# Patient Record
Sex: FEMALE | Race: BLACK OR AFRICAN AMERICAN | NOT HISPANIC OR LATINO | Employment: UNEMPLOYED | URBAN - METROPOLITAN AREA
[De-identification: names, ages, dates, MRNs, and addresses within clinical notes are randomized per-mention and may not be internally consistent; named-entity substitution may affect disease eponyms.]

---

## 2017-04-06 ENCOUNTER — HISTORICAL (OUTPATIENT)
Dept: ADMINISTRATIVE | Facility: HOSPITAL | Age: 32
End: 2017-04-06

## 2017-04-06 LAB
ALBUMIN SERPL-MCNC: 4.3 GM/DL (ref 3.4–5)
ALBUMIN/GLOB SERPL: 1 RATIO (ref 1–2)
ALP SERPL-CCNC: 90 UNIT/L (ref 20–120)
ALT SERPL-CCNC: 20 UNIT/L
AST SERPL-CCNC: 19 UNIT/L
BILIRUB SERPL-MCNC: 0.4 MG/DL
BILIRUBIN DIRECT+TOT PNL SERPL-MCNC: <0.1 MG/DL
BILIRUBIN DIRECT+TOT PNL SERPL-MCNC: >0.3 MG/DL
BUN SERPL-MCNC: 15 MG/DL (ref 7–25)
CALCIUM SERPL-MCNC: 9.8 MG/DL (ref 8.4–10.3)
CHLORIDE SERPL-SCNC: 98 MMOL/L (ref 96–110)
CO2 SERPL-SCNC: 32 MMOL/L (ref 24–32)
CREAT SERPL-MCNC: 0.54 MG/DL (ref 0.7–1.1)
CREAT UR-MCNC: 139.7 MG/DL
GLOBULIN SER-MCNC: 3.7 GM/ML (ref 2.3–3.5)
GLUCOSE SERPL-MCNC: 153 MG/DL (ref 65–99)
MICROALBUMIN UR-MCNC: 74.9 MG/L (ref 0–19)
MICROALBUMIN/CREAT RATIO PNL UR: 53.6 MCG/MG CR (ref 0–29)
POTASSIUM SERPL-SCNC: 4 MMOL/L (ref 3.6–5.2)
PROT SERPL-MCNC: 8 GM/DL (ref 6–8)
SODIUM SERPL-SCNC: 138 MMOL/L (ref 135–146)

## 2017-08-18 ENCOUNTER — HISTORICAL (OUTPATIENT)
Dept: LAB | Facility: HOSPITAL | Age: 32
End: 2017-08-18

## 2017-08-18 LAB
ABS NEUT (OLG): 4.7 X10(3)/MCL (ref 1.5–6.9)
ALBUMIN SERPL-MCNC: 3.5 GM/DL (ref 3.4–5)
ALBUMIN/GLOB SERPL: 0.8 RATIO
ALP SERPL-CCNC: 124 UNIT/L (ref 30–113)
ALT SERPL-CCNC: 37 UNIT/L (ref 10–45)
AST SERPL-CCNC: 20 UNIT/L (ref 15–37)
BASOPHILS # BLD AUTO: 0 X10(3)/MCL (ref 0–0.1)
BASOPHILS NFR BLD AUTO: 0 % (ref 0–1)
BILIRUB SERPL-MCNC: 0.7 MG/DL (ref 0.1–0.9)
BILIRUBIN DIRECT+TOT PNL SERPL-MCNC: 0.2 MG/DL (ref 0–0.3)
BILIRUBIN DIRECT+TOT PNL SERPL-MCNC: 0.5 MG/DL
BUN SERPL-MCNC: 16 MG/DL (ref 10–20)
CALCIUM SERPL-MCNC: 9.3 MG/DL (ref 8–10.5)
CHLORIDE SERPL-SCNC: 100 MMOL/L (ref 100–108)
CHOLEST SERPL-MCNC: 202 MG/DL (ref 140–200)
CHOLEST/HDLC SERPL: <3 MG/DL (ref 35–59)
CO2 SERPL-SCNC: 29 MMOL/L (ref 21–35)
CREAT SERPL-MCNC: 0.88 MG/DL (ref 0.7–1.3)
CREAT UR-MCNC: 56.3 MG/DL
EOSINOPHIL # BLD AUTO: 0.1 X10(3)/MCL (ref 0–0.6)
EOSINOPHIL NFR BLD AUTO: 1 % (ref 0–5)
ERYTHROCYTE [DISTWIDTH] IN BLOOD BY AUTOMATED COUNT: 13.3 % (ref 11.5–17)
EST. AVERAGE GLUCOSE BLD GHB EST-MCNC: 255 MG/DL
GLOBULIN SER-MCNC: 4.5 GM/DL
GLUCOSE SERPL-MCNC: 404 MG/DL (ref 75–116)
HBA1C MFR BLD: 10.5 % (ref 4.8–6)
HCT VFR BLD AUTO: 40.3 % (ref 36–48)
HDLC SERPL-MCNC: 161 MG/DL (ref 35–59)
HGB BLD-MCNC: 13.4 GM/DL (ref 12–16)
LDLC SERPL CALC-MCNC: 36 MG/DL (ref 100–129)
LYMPHOCYTES # BLD AUTO: 2.6 X10(3)/MCL (ref 0.5–4.1)
LYMPHOCYTES NFR BLD AUTO: 32.5 % (ref 15–40)
MCH RBC QN AUTO: 28 PG (ref 27–34)
MCHC RBC AUTO-ENTMCNC: 33 GM/DL (ref 31–36)
MCV RBC AUTO: 83 FL (ref 80–99)
MICROALBUMIN UR-MCNC: 0.6 MG/DL (ref 0.1–2)
MICROALBUMIN/CREAT RATIO PNL UR: 10.7 MG/GM CR (ref 0–30)
MONOCYTES # BLD AUTO: 0.6 X10(3)/MCL (ref 0–1.1)
MONOCYTES NFR BLD AUTO: 7 % (ref 4–12)
NEUTROPHILS # BLD AUTO: 4.7 X10(3)/MCL (ref 1.5–6.9)
NEUTROPHILS NFR BLD AUTO: 58 % (ref 43–75)
PLATELET # BLD AUTO: 318 X10(3)/MCL (ref 140–400)
PMV BLD AUTO: 10.2 FL (ref 6.8–10)
POTASSIUM SERPL-SCNC: 4.7 MMOL/L (ref 3.6–5.2)
PROT SERPL-MCNC: 8 GM/DL (ref 6.4–8.2)
RBC # BLD AUTO: 4.83 X10(6)/MCL (ref 4.2–5.4)
SODIUM SERPL-SCNC: 138 MMOL/L (ref 135–145)
TRIGL SERPL-MCNC: 73 MG/DL (ref 35–150)
VIT B12 SERPL-MCNC: 711 PG/ML (ref 193–986)
VLDLC SERPL CALC-MCNC: 15 MG/DL
WBC # SPEC AUTO: 8.1 X10(3)/MCL (ref 4.5–11.5)

## 2017-08-23 ENCOUNTER — HISTORICAL (OUTPATIENT)
Dept: LAB | Facility: HOSPITAL | Age: 32
End: 2017-08-23

## 2017-08-23 LAB
ABS NEUT (OLG): 5.5 X10(3)/MCL (ref 1.5–6.9)
ALBUMIN SERPL-MCNC: 3.3 GM/DL (ref 3.4–5)
ALBUMIN/GLOB SERPL: 0.8 RATIO
ALP SERPL-CCNC: 105 UNIT/L (ref 30–113)
ALT SERPL-CCNC: 32 UNIT/L (ref 10–45)
AST SERPL-CCNC: 21 UNIT/L (ref 15–37)
BASOPHILS # BLD AUTO: 0 X10(3)/MCL (ref 0–0.1)
BASOPHILS NFR BLD AUTO: 0 % (ref 0–1)
BILIRUB SERPL-MCNC: 0.4 MG/DL (ref 0.1–0.9)
BILIRUBIN DIRECT+TOT PNL SERPL-MCNC: 0.1 MG/DL (ref 0–0.3)
BILIRUBIN DIRECT+TOT PNL SERPL-MCNC: 0.3 MG/DL
BUN SERPL-MCNC: 16 MG/DL (ref 10–20)
CALCIUM SERPL-MCNC: 9 MG/DL (ref 8–10.5)
CHLORIDE SERPL-SCNC: 98 MMOL/L (ref 100–108)
CO2 SERPL-SCNC: 29 MMOL/L (ref 21–35)
CREAT SERPL-MCNC: 0.96 MG/DL (ref 0.7–1.3)
EOSINOPHIL # BLD AUTO: 0.1 X10(3)/MCL (ref 0–0.6)
EOSINOPHIL NFR BLD AUTO: 2 % (ref 0–5)
ERYTHROCYTE [DISTWIDTH] IN BLOOD BY AUTOMATED COUNT: 13.1 % (ref 11.5–17)
ERYTHROCYTE [SEDIMENTATION RATE] IN BLOOD: 11 MM/HR (ref 0–20)
GLOBULIN SER-MCNC: 4 GM/DL
GLUCOSE SERPL-MCNC: 495 MG/DL (ref 75–116)
HCT VFR BLD AUTO: 38.8 % (ref 36–48)
HGB BLD-MCNC: 12.6 GM/DL (ref 12–16)
LYMPHOCYTES # BLD AUTO: 1.9 X10(3)/MCL (ref 0.5–4.1)
LYMPHOCYTES NFR BLD AUTO: 24.3 % (ref 15–40)
MAGNESIUM SERPL-MCNC: 1.7 MG/DL (ref 1.8–2.4)
MCH RBC QN AUTO: 28 PG (ref 27–34)
MCHC RBC AUTO-ENTMCNC: 32 GM/DL (ref 31–36)
MCV RBC AUTO: 85 FL (ref 80–99)
MONOCYTES # BLD AUTO: 0.4 X10(3)/MCL (ref 0–1.1)
MONOCYTES NFR BLD AUTO: 5 % (ref 4–12)
NEUTROPHILS # BLD AUTO: 5.5 X10(3)/MCL (ref 1.5–6.9)
NEUTROPHILS NFR BLD AUTO: 69 % (ref 43–75)
PLATELET # BLD AUTO: 277 X10(3)/MCL (ref 140–400)
PMV BLD AUTO: 10.3 FL (ref 6.8–10)
POTASSIUM SERPL-SCNC: 4.1 MMOL/L (ref 3.6–5.2)
PROT SERPL-MCNC: 7.3 GM/DL (ref 6.4–8.2)
RBC # BLD AUTO: 4.58 X10(6)/MCL (ref 4.2–5.4)
SODIUM SERPL-SCNC: 134 MMOL/L (ref 135–145)
WBC # SPEC AUTO: 8 X10(3)/MCL (ref 4.5–11.5)

## 2017-10-13 ENCOUNTER — HOSPITAL ENCOUNTER (OUTPATIENT)
Dept: ONCOLOGY | Facility: HOSPITAL | Age: 32
End: 2017-10-16
Attending: INTERNAL MEDICINE | Admitting: INTERNAL MEDICINE

## 2017-10-13 LAB
ABS NEUT (OLG): 5.57 X10(3)/MCL (ref 2.1–9.2)
ALBUMIN SERPL-MCNC: 3.2 GM/DL (ref 3.4–5)
ALBUMIN/GLOB SERPL: 0.9 {RATIO}
ALP SERPL-CCNC: 125 UNIT/L (ref 38–126)
ALT SERPL-CCNC: 33 UNIT/L (ref 12–78)
AST SERPL-CCNC: 35 UNIT/L (ref 15–37)
BASOPHILS # BLD AUTO: 0 X10(3)/MCL (ref 0–0.2)
BASOPHILS NFR BLD AUTO: 0 %
BILIRUB SERPL-MCNC: 0.2 MG/DL (ref 0.2–1)
BILIRUBIN DIRECT+TOT PNL SERPL-MCNC: 0.1 MG/DL (ref 0–0.2)
BILIRUBIN DIRECT+TOT PNL SERPL-MCNC: 0.1 MG/DL (ref 0–0.8)
BUN SERPL-MCNC: 11 MG/DL (ref 7–18)
CALCIUM SERPL-MCNC: 9.3 MG/DL (ref 8.5–10.1)
CHLORIDE SERPL-SCNC: 99 MMOL/L (ref 98–107)
CO2 SERPL-SCNC: 28 MMOL/L (ref 21–32)
CREAT SERPL-MCNC: 0.83 MG/DL (ref 0.55–1.02)
EOSINOPHIL # BLD AUTO: 0.1 X10(3)/MCL (ref 0–0.9)
EOSINOPHIL NFR BLD AUTO: 2 %
ERYTHROCYTE [DISTWIDTH] IN BLOOD BY AUTOMATED COUNT: 13.4 % (ref 11.5–17)
GLOBULIN SER-MCNC: 3.7 GM/DL (ref 2.4–3.5)
GLUCOSE SERPL-MCNC: 362 MG/DL (ref 74–106)
HCT VFR BLD AUTO: 40.2 % (ref 37–47)
HGB BLD-MCNC: 13 GM/DL (ref 12–16)
LIPASE SERPL-CCNC: 208 UNIT/L (ref 73–393)
LYMPHOCYTES # BLD AUTO: 2.2 X10(3)/MCL (ref 0.6–4.6)
LYMPHOCYTES NFR BLD AUTO: 26 %
MCH RBC QN AUTO: 28.6 PG (ref 27–31)
MCHC RBC AUTO-ENTMCNC: 32.3 GM/DL (ref 33–36)
MCV RBC AUTO: 88.4 FL (ref 80–94)
MONOCYTES # BLD AUTO: 0.5 X10(3)/MCL (ref 0.1–1.3)
MONOCYTES NFR BLD AUTO: 6 %
NEUTROPHILS # BLD AUTO: 5.57 X10(3)/MCL (ref 1.4–7.9)
NEUTROPHILS NFR BLD AUTO: 66 %
PLATELET # BLD AUTO: 252 X10(3)/MCL (ref 130–400)
PMV BLD AUTO: 10.1 FL (ref 9.4–12.4)
POTASSIUM SERPL-SCNC: 4.7 MMOL/L (ref 3.5–5.1)
PROT SERPL-MCNC: 6.9 GM/DL (ref 6.4–8.2)
RBC # BLD AUTO: 4.55 X10(6)/MCL (ref 4.2–5.4)
SODIUM SERPL-SCNC: 136 MMOL/L (ref 136–145)
WBC # SPEC AUTO: 8.4 X10(3)/MCL (ref 4.5–11.5)

## 2017-10-14 LAB
APPEARANCE, UA: CLEAR
BACTERIA SPEC CULT: ABNORMAL /HPF
BILIRUB UR QL STRIP: NEGATIVE
COLOR UR: YELLOW
GLUCOSE (UA): ABNORMAL
HGB UR QL STRIP: NEGATIVE
KETONES UR QL STRIP: NEGATIVE
LEUKOCYTE ESTERASE UR QL STRIP: ABNORMAL
NITRITE UR QL STRIP: NEGATIVE
PH UR STRIP: 7.5 [PH] (ref 5–9)
PROT UR QL STRIP: NEGATIVE
RBC #/AREA URNS HPF: ABNORMAL /[HPF]
SP GR UR STRIP: 1.03 (ref 1–1.03)
SQUAMOUS EPITHELIAL, UA: ABNORMAL
UROBILINOGEN UR STRIP-ACNC: 0.2
WBC #/AREA URNS HPF: 11 /HPF (ref 0–3)

## 2017-10-15 LAB
ABS NEUT (OLG): 3.84 X10(3)/MCL (ref 2.1–9.2)
BASOPHILS # BLD AUTO: 0 X10(3)/MCL (ref 0–0.2)
BASOPHILS NFR BLD AUTO: 0 %
BUN SERPL-MCNC: 13 MG/DL (ref 7–18)
CALCIUM SERPL-MCNC: 8.3 MG/DL (ref 8.5–10.1)
CHLORIDE SERPL-SCNC: 104 MMOL/L (ref 98–107)
CO2 SERPL-SCNC: 28 MMOL/L (ref 21–32)
CREAT SERPL-MCNC: 0.64 MG/DL (ref 0.55–1.02)
EOSINOPHIL # BLD AUTO: 0.2 X10(3)/MCL (ref 0–0.9)
EOSINOPHIL NFR BLD AUTO: 3 %
ERYTHROCYTE [DISTWIDTH] IN BLOOD BY AUTOMATED COUNT: 13.7 % (ref 11.5–17)
GLUCOSE SERPL-MCNC: 249 MG/DL (ref 74–106)
HCT VFR BLD AUTO: 36.9 % (ref 37–47)
HGB BLD-MCNC: 11.6 GM/DL (ref 12–16)
LYMPHOCYTES # BLD AUTO: 2.6 X10(3)/MCL (ref 0.6–4.6)
LYMPHOCYTES NFR BLD AUTO: 36 %
MCH RBC QN AUTO: 27.8 PG (ref 27–31)
MCHC RBC AUTO-ENTMCNC: 31.4 GM/DL (ref 33–36)
MCV RBC AUTO: 88.5 FL (ref 80–94)
MONOCYTES # BLD AUTO: 0.6 X10(3)/MCL (ref 0.1–1.3)
MONOCYTES NFR BLD AUTO: 8 %
NEUTROPHILS # BLD AUTO: 3.84 X10(3)/MCL (ref 2.1–9.2)
NEUTROPHILS NFR BLD AUTO: 52 %
PLATELET # BLD AUTO: 208 X10(3)/MCL (ref 130–400)
PMV BLD AUTO: 9.9 FL (ref 9.4–12.4)
POTASSIUM SERPL-SCNC: 4.3 MMOL/L (ref 3.5–5.1)
RBC # BLD AUTO: 4.17 X10(6)/MCL (ref 4.2–5.4)
SODIUM SERPL-SCNC: 138 MMOL/L (ref 136–145)
WBC # SPEC AUTO: 7.4 X10(3)/MCL (ref 4.5–11.5)

## 2018-02-01 ENCOUNTER — HISTORICAL (OUTPATIENT)
Dept: LAB | Facility: HOSPITAL | Age: 33
End: 2018-02-01

## 2018-02-01 LAB
ABS NEUT (OLG): 6.2 X10(3)/MCL (ref 1.5–6.9)
ALBUMIN SERPL-MCNC: 3.3 GM/DL (ref 3.4–5)
ALBUMIN/GLOB SERPL: 0.7 RATIO
ALP SERPL-CCNC: 109 UNIT/L (ref 30–113)
ALT SERPL-CCNC: 27 UNIT/L (ref 10–45)
AST SERPL-CCNC: 19 UNIT/L (ref 15–37)
BASOPHILS # BLD AUTO: 0 X10(3)/MCL (ref 0–0.1)
BASOPHILS NFR BLD AUTO: 0 % (ref 0–1)
BILIRUB SERPL-MCNC: 0.4 MG/DL (ref 0.1–0.9)
BILIRUBIN DIRECT+TOT PNL SERPL-MCNC: 0.1 MG/DL (ref 0–0.3)
BILIRUBIN DIRECT+TOT PNL SERPL-MCNC: 0.3 MG/DL
BUN SERPL-MCNC: 18 MG/DL (ref 10–20)
CALCIUM SERPL-MCNC: 10.2 MG/DL (ref 8–10.5)
CHLORIDE SERPL-SCNC: 102 MMOL/L (ref 100–108)
CO2 SERPL-SCNC: 29 MMOL/L (ref 21–35)
CREAT SERPL-MCNC: 0.92 MG/DL (ref 0.7–1.3)
EOSINOPHIL # BLD AUTO: 0.2 X10(3)/MCL (ref 0–0.6)
EOSINOPHIL NFR BLD AUTO: 2 % (ref 0–5)
ERYTHROCYTE [DISTWIDTH] IN BLOOD BY AUTOMATED COUNT: 14.2 % (ref 11.5–17)
ERYTHROCYTE [SEDIMENTATION RATE] IN BLOOD: 26 MM/HR (ref 0–20)
GLOBULIN SER-MCNC: 4.8 GM/DL
GLUCOSE SERPL-MCNC: 187 MG/DL (ref 75–116)
HCT VFR BLD AUTO: 37.1 % (ref 36–48)
HGB BLD-MCNC: 12 GM/DL (ref 12–16)
LYMPHOCYTES # BLD AUTO: 2.6 X10(3)/MCL (ref 0.5–4.1)
LYMPHOCYTES NFR BLD AUTO: 26.6 % (ref 15–40)
MAGNESIUM SERPL-MCNC: 2.2 MG/DL (ref 1.8–2.4)
MCH RBC QN AUTO: 27 PG (ref 27–34)
MCHC RBC AUTO-ENTMCNC: 32 GM/DL (ref 31–36)
MCV RBC AUTO: 83 FL (ref 80–99)
MONOCYTES # BLD AUTO: 0.6 X10(3)/MCL (ref 0–1.1)
MONOCYTES NFR BLD AUTO: 6 % (ref 4–12)
NEUTROPHILS # BLD AUTO: 6.2 X10(3)/MCL (ref 1.5–6.9)
NEUTROPHILS NFR BLD AUTO: 64 % (ref 43–75)
PLATELET # BLD AUTO: 387 X10(3)/MCL (ref 140–400)
PMV BLD AUTO: 9.9 FL (ref 6.8–10)
POTASSIUM SERPL-SCNC: 4.4 MMOL/L (ref 3.6–5.2)
PROT SERPL-MCNC: 8.1 GM/DL (ref 6.4–8.2)
RBC # BLD AUTO: 4.48 X10(6)/MCL (ref 4.2–5.4)
SODIUM SERPL-SCNC: 138 MMOL/L (ref 135–145)
TSH SERPL-ACNC: 2.14 MIU/ML (ref 0.36–3.74)
WBC # SPEC AUTO: 9.6 X10(3)/MCL (ref 4.5–11.5)

## 2018-05-06 ENCOUNTER — HOSPITAL ENCOUNTER (OUTPATIENT)
Dept: OCCUPATIONAL THERAPY | Facility: HOSPITAL | Age: 33
End: 2018-05-09
Attending: INTERNAL MEDICINE | Admitting: INTERNAL MEDICINE

## 2018-05-06 LAB
ABS NEUT (OLG): 5.99 X10(3)/MCL (ref 2.1–9.2)
ALBUMIN SERPL-MCNC: 3.3 GM/DL (ref 3.4–5)
ALBUMIN/GLOB SERPL: 0.9 RATIO (ref 1.1–2)
ALP SERPL-CCNC: 120 UNIT/L (ref 38–126)
ALT SERPL-CCNC: 33 UNIT/L (ref 12–78)
APPEARANCE, UA: CLEAR
AST SERPL-CCNC: 31 UNIT/L (ref 15–37)
B-OH-BUTYR SERPL-MCNC: 10 MG/DL (ref 0–2.78)
BACTERIA SPEC CULT: ABNORMAL /HPF
BASOPHILS # BLD AUTO: 0 X10(3)/MCL (ref 0–0.2)
BASOPHILS NFR BLD AUTO: 0 %
BILIRUB SERPL-MCNC: 0.7 MG/DL (ref 0.2–1)
BILIRUB UR QL STRIP: NEGATIVE
BILIRUBIN DIRECT+TOT PNL SERPL-MCNC: 0.2 MG/DL (ref 0–0.5)
BILIRUBIN DIRECT+TOT PNL SERPL-MCNC: 0.5 MG/DL (ref 0–0.8)
BUN SERPL-MCNC: 16 MG/DL (ref 7–18)
CALCIUM SERPL-MCNC: 9.4 MG/DL (ref 8.5–10.1)
CHLORIDE SERPL-SCNC: 96 MMOL/L (ref 98–107)
CO2 SERPL-SCNC: 27 MMOL/L (ref 21–32)
COLOR UR: YELLOW
CREAT SERPL-MCNC: 1.07 MG/DL (ref 0.55–1.02)
EOSINOPHIL # BLD AUTO: 0.1 X10(3)/MCL (ref 0–0.9)
EOSINOPHIL NFR BLD AUTO: 1 %
ERYTHROCYTE [DISTWIDTH] IN BLOOD BY AUTOMATED COUNT: 13.7 % (ref 11.5–17)
EST. AVERAGE GLUCOSE BLD GHB EST-MCNC: 255 MG/DL
GLOBULIN SER-MCNC: 3.8 GM/DL (ref 2.4–3.5)
GLUCOSE (UA): ABNORMAL
GLUCOSE SERPL-MCNC: 610 MG/DL (ref 74–106)
HBA1C MFR BLD: 10.5 % (ref 4.2–6.3)
HCO3 UR-SCNC: 26 MMOL/L (ref 22–26)
HCT VFR BLD AUTO: 41.7 % (ref 37–47)
HGB BLD-MCNC: 12.8 GM/DL (ref 12–16)
HGB UR QL STRIP: NEGATIVE
KETONES UR QL STRIP: ABNORMAL
LEUKOCYTE ESTERASE UR QL STRIP: NEGATIVE
LIPASE SERPL-CCNC: 233 UNIT/L (ref 73–393)
LYMPHOCYTES # BLD AUTO: 1.8 X10(3)/MCL (ref 0.6–4.6)
LYMPHOCYTES NFR BLD AUTO: 21 %
MCH RBC QN AUTO: 27.1 PG (ref 27–31)
MCHC RBC AUTO-ENTMCNC: 30.7 GM/DL (ref 33–36)
MCV RBC AUTO: 88.2 FL (ref 80–94)
MONOCYTES # BLD AUTO: 0.5 X10(3)/MCL (ref 0.1–1.3)
MONOCYTES NFR BLD AUTO: 6 %
NEUTROPHILS # BLD AUTO: 5.99 X10(3)/MCL (ref 2.1–9.2)
NEUTROPHILS NFR BLD AUTO: 71 %
NITRITE UR QL STRIP: NEGATIVE
O2 HGB ARTERIAL: 96.7 % (ref 94–97)
PCO2 BLDA: 42 MMHG (ref 35–45)
PH SMN: 7.4 [PH] (ref 7.35–7.45)
PH UR STRIP: 7 [PH] (ref 5–9)
PLATELET # BLD AUTO: 265 X10(3)/MCL (ref 130–400)
PMV BLD AUTO: 10.9 FL (ref 9.4–12.4)
PO2 BLDA: 106 MMHG (ref 80–100)
POC ALLENS TEST: ABNORMAL
POC BE: 1 (ref -2–2)
POC CAO2: 17 ML/DL (ref 15.7–21.6)
POC CO HGB: 1 %
POC CO2: 27.3 MMOL/L (ref 22–27)
POC IONIZED CALCIUM: 1.21 MMOL/L (ref 1.12–1.23)
POC MET HGB: 0.2 % (ref 0.4–1.5)
POC SAMPLESOURCE: ABNORMAL
POC SATURATED O2: 98.1 % (ref 95–98)
POC SITE: ABNORMAL
POC THB: 12.4 GM/DL (ref 12–16)
POC TREATMENT: ABNORMAL
POTASSIUM BLD-SCNC: 4.8 MMOL/L (ref 3.6–5)
POTASSIUM SERPL-SCNC: 5 MMOL/L (ref 3.5–5.1)
PROT SERPL-MCNC: 7.1 GM/DL (ref 6.4–8.2)
PROT UR QL STRIP: NEGATIVE
RBC # BLD AUTO: 4.73 X10(6)/MCL (ref 4.2–5.4)
RBC #/AREA URNS HPF: ABNORMAL /[HPF]
SETTING 1 ABG: ABNORMAL
SETTING 2 ABG: ABNORMAL
SETTING 3 ABG: ABNORMAL
SETTING 4 ABG: ABNORMAL
SODIUM BLD-SCNC: 128 MMOL/L (ref 137–145)
SODIUM SERPL-SCNC: 131 MMOL/L (ref 136–145)
SP GR UR STRIP: 1.03 (ref 1–1.03)
SQUAMOUS EPITHELIAL, UA: ABNORMAL
UROBILINOGEN UR STRIP-ACNC: 0.2
WBC # SPEC AUTO: 8.4 X10(3)/MCL (ref 4.5–11.5)
WBC #/AREA URNS HPF: ABNORMAL /[HPF]

## 2018-05-07 LAB
ABS NEUT (OLG): 3.71 X10(3)/MCL (ref 2.1–9.2)
ALBUMIN SERPL-MCNC: 2.7 GM/DL (ref 3.4–5)
ALBUMIN/GLOB SERPL: 0.8 {RATIO}
ALP SERPL-CCNC: 98 UNIT/L (ref 38–126)
ALT SERPL-CCNC: 25 UNIT/L (ref 12–78)
AST SERPL-CCNC: 18 UNIT/L (ref 15–37)
BASOPHILS # BLD AUTO: 0 X10(3)/MCL (ref 0–0.2)
BASOPHILS NFR BLD AUTO: 0 %
BILIRUB SERPL-MCNC: 0.2 MG/DL (ref 0.2–1)
BILIRUBIN DIRECT+TOT PNL SERPL-MCNC: 0.1 MG/DL (ref 0–0.2)
BILIRUBIN DIRECT+TOT PNL SERPL-MCNC: 0.1 MG/DL (ref 0–0.8)
BUN SERPL-MCNC: 8 MG/DL (ref 7–18)
CALCIUM SERPL-MCNC: 8.3 MG/DL (ref 8.5–10.1)
CHLORIDE SERPL-SCNC: 108 MMOL/L (ref 98–107)
CO2 SERPL-SCNC: 26 MMOL/L (ref 21–32)
CREAT SERPL-MCNC: 0.84 MG/DL (ref 0.55–1.02)
EOSINOPHIL # BLD AUTO: 0.2 X10(3)/MCL (ref 0–0.9)
EOSINOPHIL NFR BLD AUTO: 3 %
ERYTHROCYTE [DISTWIDTH] IN BLOOD BY AUTOMATED COUNT: 13.8 % (ref 11.5–17)
GLOBULIN SER-MCNC: 3.2 GM/DL (ref 2.4–3.5)
GLUCOSE SERPL-MCNC: 238 MG/DL (ref 74–106)
HCT VFR BLD AUTO: 37.6 % (ref 37–47)
HGB BLD-MCNC: 11.5 GM/DL (ref 12–16)
LYMPHOCYTES # BLD AUTO: 2.3 X10(3)/MCL (ref 0.6–4.6)
LYMPHOCYTES NFR BLD AUTO: 34 %
MCH RBC QN AUTO: 27.6 PG (ref 27–31)
MCHC RBC AUTO-ENTMCNC: 30.6 GM/DL (ref 33–36)
MCV RBC AUTO: 90.4 FL (ref 80–94)
MONOCYTES # BLD AUTO: 0.5 X10(3)/MCL (ref 0.1–1.3)
MONOCYTES NFR BLD AUTO: 8 %
NEUTROPHILS # BLD AUTO: 3.71 X10(3)/MCL (ref 2.1–9.2)
NEUTROPHILS NFR BLD AUTO: 55 %
PLATELET # BLD AUTO: 252 X10(3)/MCL (ref 130–400)
PMV BLD AUTO: 9.9 FL (ref 9.4–12.4)
POTASSIUM SERPL-SCNC: 4.3 MMOL/L (ref 3.5–5.1)
PROT SERPL-MCNC: 5.9 GM/DL (ref 6.4–8.2)
RBC # BLD AUTO: 4.16 X10(6)/MCL (ref 4.2–5.4)
SODIUM SERPL-SCNC: 143 MMOL/L (ref 136–145)
WBC # SPEC AUTO: 6.8 X10(3)/MCL (ref 4.5–11.5)

## 2018-06-14 ENCOUNTER — TELEPHONE (OUTPATIENT)
Dept: GASTROENTEROLOGY | Facility: CLINIC | Age: 33
End: 2018-06-14

## 2018-09-11 ENCOUNTER — TELEPHONE (OUTPATIENT)
Dept: GASTROENTEROLOGY | Facility: CLINIC | Age: 33
End: 2018-09-11

## 2019-01-28 ENCOUNTER — HOSPITAL ENCOUNTER (OUTPATIENT)
Dept: OCCUPATIONAL THERAPY | Facility: HOSPITAL | Age: 34
End: 2019-01-30
Attending: INTERNAL MEDICINE | Admitting: INTERNAL MEDICINE

## 2019-01-28 LAB
ABS NEUT (OLG): 5.55 X10(3)/MCL (ref 2.1–9.2)
ALBUMIN SERPL-MCNC: 3.5 GM/DL (ref 3.4–5)
ALBUMIN/GLOB SERPL: 1 RATIO (ref 1.1–2)
ALP SERPL-CCNC: 95 UNIT/L (ref 38–126)
ALT SERPL-CCNC: 18 UNIT/L (ref 12–78)
APPEARANCE, UA: CLEAR
AST SERPL-CCNC: 39 UNIT/L (ref 15–37)
BACTERIA SPEC CULT: ABNORMAL /HPF
BASOPHILS # BLD AUTO: 0 X10(3)/MCL (ref 0–0.2)
BASOPHILS NFR BLD AUTO: 0 %
BILIRUB SERPL-MCNC: 0.5 MG/DL (ref 0.2–1)
BILIRUB UR QL STRIP: NEGATIVE
BILIRUBIN DIRECT+TOT PNL SERPL-MCNC: 0.1 MG/DL (ref 0–0.5)
BILIRUBIN DIRECT+TOT PNL SERPL-MCNC: 0.4 MG/DL (ref 0–0.8)
BUN SERPL-MCNC: 13 MG/DL (ref 7–18)
CALCIUM SERPL-MCNC: 8.7 MG/DL (ref 8.5–10.1)
CHLORIDE SERPL-SCNC: 99 MMOL/L (ref 98–107)
CO2 SERPL-SCNC: 30 MMOL/L (ref 21–32)
COLOR UR: YELLOW
CREAT SERPL-MCNC: 0.93 MG/DL (ref 0.55–1.02)
EOSINOPHIL # BLD AUTO: 0.2 X10(3)/MCL (ref 0–0.9)
EOSINOPHIL NFR BLD AUTO: 2 %
ERYTHROCYTE [DISTWIDTH] IN BLOOD BY AUTOMATED COUNT: 12.5 % (ref 11.5–17)
GLOBULIN SER-MCNC: 3.5 GM/DL (ref 2.4–3.5)
GLUCOSE (UA): ABNORMAL
GLUCOSE SERPL-MCNC: 552 MG/DL (ref 74–106)
HCT VFR BLD AUTO: 42.4 % (ref 37–47)
HGB BLD-MCNC: 13.6 GM/DL (ref 12–16)
HGB UR QL STRIP: ABNORMAL
KETONES UR QL STRIP: ABNORMAL
LEUKOCYTE ESTERASE UR QL STRIP: NEGATIVE
LIPASE SERPL-CCNC: 106 UNIT/L (ref 73–393)
LYMPHOCYTES # BLD AUTO: 1.8 X10(3)/MCL (ref 0.6–4.6)
LYMPHOCYTES NFR BLD AUTO: 22 %
MCH RBC QN AUTO: 28.1 PG (ref 27–31)
MCHC RBC AUTO-ENTMCNC: 32.1 GM/DL (ref 33–36)
MCV RBC AUTO: 87.6 FL (ref 80–94)
MONOCYTES # BLD AUTO: 0.6 X10(3)/MCL (ref 0.1–1.3)
MONOCYTES NFR BLD AUTO: 7 %
NEUTROPHILS # BLD AUTO: 5.55 X10(3)/MCL (ref 2.1–9.2)
NEUTROPHILS NFR BLD AUTO: 68 %
NITRITE UR QL STRIP: NEGATIVE
PH UR STRIP: 8 [PH] (ref 5–9)
PLATELET # BLD AUTO: 256 X10(3)/MCL (ref 130–400)
PMV BLD AUTO: 11.2 FL (ref 9.4–12.4)
POTASSIUM SERPL-SCNC: 4.6 MMOL/L (ref 3.5–5.1)
PROT SERPL-MCNC: 7 GM/DL (ref 6.4–8.2)
PROT UR QL STRIP: NEGATIVE
RBC # BLD AUTO: 4.84 X10(6)/MCL (ref 4.2–5.4)
RBC #/AREA URNS HPF: 116 /HPF (ref 0–2)
RESTICK: NORMAL
SODIUM SERPL-SCNC: 134 MMOL/L (ref 136–145)
SP GR UR STRIP: 1.03 (ref 1–1.03)
SQUAMOUS EPITHELIAL, UA: ABNORMAL
UROBILINOGEN UR STRIP-ACNC: 0.2
WBC # SPEC AUTO: 8.2 X10(3)/MCL (ref 4.5–11.5)
WBC #/AREA URNS HPF: ABNORMAL /[HPF]

## 2019-01-29 LAB
BUN SERPL-MCNC: 12 MG/DL (ref 7–18)
CALCIUM SERPL-MCNC: 7.6 MG/DL (ref 8.5–10.1)
CHLORIDE SERPL-SCNC: 109 MMOL/L (ref 98–107)
CO2 SERPL-SCNC: 27 MMOL/L (ref 21–32)
CREAT SERPL-MCNC: 0.83 MG/DL (ref 0.55–1.02)
CREAT/UREA NIT SERPL: 14.5
GLUCOSE SERPL-MCNC: 298 MG/DL (ref 74–106)
POTASSIUM SERPL-SCNC: 4.2 MMOL/L (ref 3.5–5.1)
SODIUM SERPL-SCNC: 143 MMOL/L (ref 136–145)

## 2019-08-25 ENCOUNTER — HOSPITAL ENCOUNTER (OUTPATIENT)
Dept: MEDSURG UNIT | Facility: HOSPITAL | Age: 34
End: 2019-08-28
Attending: INTERNAL MEDICINE | Admitting: INTERNAL MEDICINE

## 2019-08-25 LAB
ABS NEUT (OLG): 4.3 X10(3)/MCL (ref 1.5–6.9)
ALBUMIN SERPL-MCNC: 3.5 GM/DL (ref 3.4–5)
ALBUMIN/GLOB SERPL: 0.7 RATIO
ALP SERPL-CCNC: 113 UNIT/L (ref 30–113)
AMYLASE SERPL-CCNC: 71 UNIT/L (ref 25–115)
APPEARANCE, UA: ABNORMAL
AST SERPL-CCNC: 25 UNIT/L (ref 15–37)
B-HCG FREE SERPL-ACNC: 1 MIU/ML
BASOPHILS # BLD AUTO: 0 X10(3)/MCL (ref 0–0.1)
BASOPHILS NFR BLD AUTO: 0 % (ref 0–1)
BILIRUB SERPL-MCNC: 0.2 MG/DL (ref 0.1–0.9)
BILIRUB UR QL STRIP: NEGATIVE
BILIRUBIN DIRECT+TOT PNL SERPL-MCNC: 0.1 MG/DL
BILIRUBIN DIRECT+TOT PNL SERPL-MCNC: 0.1 MG/DL (ref 0–0.3)
BUN SERPL-MCNC: 10 MG/DL (ref 10–20)
BUN SERPL-MCNC: 12 MG/DL (ref 10–20)
CALCIUM SERPL-MCNC: 7.7 MG/DL (ref 8–10.5)
CALCIUM SERPL-MCNC: 9 MG/DL (ref 8–10.5)
CHLORIDE SERPL-SCNC: 105 MMOL/L (ref 100–108)
CHLORIDE SERPL-SCNC: 96 MMOL/L (ref 100–108)
CO2 SERPL-SCNC: 26 MMOL/L (ref 21–35)
CO2 SERPL-SCNC: 27 MMOL/L (ref 21–35)
COLOR UR: ABNORMAL
CREAT SERPL-MCNC: 0.87 MG/DL (ref 0.7–1.3)
CREAT SERPL-MCNC: 1.12 MG/DL (ref 0.7–1.3)
CREAT/UREA NIT SERPL: 11
EOSINOPHIL # BLD AUTO: 0.1 X10(3)/MCL (ref 0–0.6)
EOSINOPHIL NFR BLD AUTO: 2 % (ref 0–5)
ERYTHROCYTE [DISTWIDTH] IN BLOOD BY AUTOMATED COUNT: 12.7 % (ref 11.5–17)
GLOBULIN SER-MCNC: 4.7 GM/DL
GLUCOSE (UA): >=1000 MG/DL
GLUCOSE SERPL-MCNC: 363 MG/DL (ref 75–116)
GLUCOSE SERPL-MCNC: 616 MG/DL (ref 75–116)
HCO3 UR-SCNC: 23.7 MMOL/L (ref 22–26)
HCT VFR BLD AUTO: 44.7 % (ref 36–48)
HGB BLD-MCNC: 13.9 GM/DL (ref 12–16)
HGB UR QL STRIP: NEGATIVE
IMM GRANULOCYTES # BLD AUTO: 0.01 10*3/UL (ref 0–0.02)
IMM GRANULOCYTES NFR BLD AUTO: 0.2 % (ref 0–0.43)
KETONES UR QL STRIP: NEGATIVE
LEUKOCYTE ESTERASE UR QL STRIP: NEGATIVE
LIPASE SERPL-CCNC: 182 UNIT/L (ref 21–261)
LYMPHOCYTES # BLD AUTO: 1.7 X10(3)/MCL (ref 0.5–4.1)
LYMPHOCYTES NFR BLD AUTO: 26 % (ref 15–40)
MCH RBC QN AUTO: 28 PG (ref 27–34)
MCHC RBC AUTO-ENTMCNC: 31 GM/DL (ref 31–36)
MCV RBC AUTO: 91 FL (ref 80–99)
MONOCYTES # BLD AUTO: 0.4 X10(3)/MCL (ref 0–1.1)
MONOCYTES NFR BLD AUTO: 6 % (ref 4–12)
NEUTROPHILS # BLD AUTO: 4.3 X10(3)/MCL (ref 1.5–6.9)
NEUTROPHILS NFR BLD AUTO: 66 % (ref 43–75)
NITRITE UR QL STRIP: NEGATIVE
PCO2 BLDA: 44.3 MMHG (ref 35–45)
PH SMN: 7.34 [PH] (ref 7.35–7.45)
PH UR STRIP: 7 [PH]
PLATELET # BLD AUTO: 220 X10(3)/MCL (ref 140–400)
PMV BLD AUTO: 10.7 FL (ref 6.8–10)
PO2 BLDA: 108 MMHG (ref 80–100)
POC ALLENS TEST: NORMAL
POC BE: -2 MMOL/L (ref -2–3)
POC SAMPLESOURCE: NORMAL
POC SATURATED O2: 98 % (ref 96–97)
POC SITE: NORMAL
POC TCO2: 25 MMOL/L (ref 24–29)
POC TREATMENT: NORMAL
POTASSIUM SERPL-SCNC: 4.1 MMOL/L (ref 3.6–5.2)
POTASSIUM SERPL-SCNC: 5.1 MMOL/L (ref 3.6–5.2)
PROT SERPL-MCNC: 8.2 GM/DL (ref 6.4–8.2)
PROT UR QL STRIP: NEGATIVE
RBC # BLD AUTO: 4.92 X10(6)/MCL (ref 4.2–5.4)
SODIUM SERPL-SCNC: 131 MMOL/L (ref 135–145)
SODIUM SERPL-SCNC: 139 MMOL/L (ref 135–145)
SP GR UR STRIP: <=1.005
TSH SERPL-ACNC: 1.58 MIU/ML (ref 0.36–3.74)
UROBILINOGEN UR STRIP-ACNC: 0.2 EU/DL
WBC # SPEC AUTO: 6.5 X10(3)/MCL (ref 4.5–11.5)

## 2019-08-26 LAB
ABS NEUT (OLG): 2.6 X10(3)/MCL (ref 1.5–6.9)
ALBUMIN SERPL-MCNC: 2.6 GM/DL (ref 3.4–5)
ALBUMIN/GLOB SERPL: 0.8 RATIO
ALP SERPL-CCNC: 81 UNIT/L (ref 30–113)
ALT SERPL-CCNC: 22 UNIT/L (ref 10–45)
AST SERPL-CCNC: 21 UNIT/L (ref 15–37)
BASOPHILS # BLD AUTO: 0 X10(3)/MCL (ref 0–0.1)
BASOPHILS NFR BLD AUTO: 0 % (ref 0–1)
BILIRUB SERPL-MCNC: 0.1 MG/DL (ref 0.1–0.9)
BILIRUBIN DIRECT+TOT PNL SERPL-MCNC: 0 MG/DL
BILIRUBIN DIRECT+TOT PNL SERPL-MCNC: 0.1 MG/DL (ref 0–0.3)
BUN SERPL-MCNC: 12 MG/DL (ref 10–20)
CALCIUM SERPL-MCNC: 7.7 MG/DL (ref 8–10.5)
CHLORIDE SERPL-SCNC: 112 MMOL/L (ref 100–108)
CO2 SERPL-SCNC: 26 MMOL/L (ref 21–35)
CREAT SERPL-MCNC: 0.71 MG/DL (ref 0.7–1.3)
EOSINOPHIL # BLD AUTO: 0.2 X10(3)/MCL (ref 0–0.6)
EOSINOPHIL NFR BLD AUTO: 3 % (ref 0–5)
ERYTHROCYTE [DISTWIDTH] IN BLOOD BY AUTOMATED COUNT: 12.7 % (ref 11.5–17)
EST. AVERAGE GLUCOSE BLD GHB EST-MCNC: 280 MG/DL
GLOBULIN SER-MCNC: 3.4 GM/DL
GLUCOSE SERPL-MCNC: 117 MG/DL (ref 75–116)
HBA1C MFR BLD: 11.4 % (ref 4.8–6)
HCT VFR BLD AUTO: 37.8 % (ref 36–48)
HGB BLD-MCNC: 11.9 GM/DL (ref 12–16)
LYMPHOCYTES # BLD AUTO: 2.8 X10(3)/MCL (ref 0.5–4.1)
LYMPHOCYTES NFR BLD AUTO: 47 % (ref 15–40)
MAGNESIUM SERPL-MCNC: 2.1 MG/DL (ref 1.8–2.4)
MCH RBC QN AUTO: 29 PG (ref 27–34)
MCHC RBC AUTO-ENTMCNC: 32 GM/DL (ref 31–36)
MCV RBC AUTO: 91 FL (ref 80–99)
MONOCYTES # BLD AUTO: 0.4 X10(3)/MCL (ref 0–1.1)
MONOCYTES NFR BLD AUTO: 6 % (ref 4–12)
NEUTROPHILS # BLD AUTO: 2.6 X10(3)/MCL (ref 1.5–6.9)
NEUTROPHILS NFR BLD AUTO: 44 % (ref 43–75)
PLATELET # BLD AUTO: 202 X10(3)/MCL (ref 140–400)
PMV BLD AUTO: 10.8 FL (ref 6.8–10)
POTASSIUM SERPL-SCNC: 3.6 MMOL/L (ref 3.6–5.2)
PROT SERPL-MCNC: 6 GM/DL (ref 6.4–8.2)
RBC # BLD AUTO: 4.15 X10(6)/MCL (ref 4.2–5.4)
SODIUM SERPL-SCNC: 143 MMOL/L (ref 135–145)
WBC # SPEC AUTO: 5.9 X10(3)/MCL (ref 4.5–11.5)

## 2019-08-27 LAB
BUN SERPL-MCNC: 18 MG/DL (ref 10–20)
CALCIUM SERPL-MCNC: 8.3 MG/DL (ref 8–10.5)
CHLORIDE SERPL-SCNC: 108 MMOL/L (ref 100–108)
CO2 SERPL-SCNC: 28 MMOL/L (ref 21–35)
CREAT SERPL-MCNC: 0.67 MG/DL (ref 0.7–1.3)
CREAT/UREA NIT SERPL: 27
GLUCOSE SERPL-MCNC: 135 MG/DL (ref 75–116)
MAGNESIUM SERPL-MCNC: 2.1 MG/DL (ref 1.8–2.4)
POTASSIUM SERPL-SCNC: 4.1 MMOL/L (ref 3.6–5.2)
SODIUM SERPL-SCNC: 141 MMOL/L (ref 135–145)

## 2019-08-28 LAB
ABS NEUT (OLG): 3.8 X10(3)/MCL (ref 1.5–6.9)
ALBUMIN SERPL-MCNC: 2.5 GM/DL (ref 3.4–5)
ALBUMIN/GLOB SERPL: 0.7 RATIO
ALP SERPL-CCNC: 78 UNIT/L (ref 30–113)
ALT SERPL-CCNC: 22 UNIT/L (ref 10–45)
AST SERPL-CCNC: 18 UNIT/L (ref 15–37)
BASOPHILS # BLD AUTO: 0 X10(3)/MCL (ref 0–0.1)
BASOPHILS NFR BLD AUTO: 0 % (ref 0–1)
BILIRUB SERPL-MCNC: 0.1 MG/DL (ref 0.1–0.9)
BILIRUBIN DIRECT+TOT PNL SERPL-MCNC: 0 MG/DL
BILIRUBIN DIRECT+TOT PNL SERPL-MCNC: 0.1 MG/DL (ref 0–0.3)
BUN SERPL-MCNC: 16 MG/DL (ref 10–20)
CALCIUM SERPL-MCNC: 8.6 MG/DL (ref 8–10.5)
CHLORIDE SERPL-SCNC: 108 MMOL/L (ref 100–108)
CO2 SERPL-SCNC: 32 MMOL/L (ref 21–35)
CREAT SERPL-MCNC: 0.89 MG/DL (ref 0.7–1.3)
EOSINOPHIL # BLD AUTO: 0.2 X10(3)/MCL (ref 0–0.6)
EOSINOPHIL NFR BLD AUTO: 2 % (ref 0–5)
ERYTHROCYTE [DISTWIDTH] IN BLOOD BY AUTOMATED COUNT: 12.7 % (ref 11.5–17)
GLOBULIN SER-MCNC: 3.5 GM/DL
GLUCOSE SERPL-MCNC: 147 MG/DL (ref 75–116)
HCT VFR BLD AUTO: 37.3 % (ref 36–48)
HGB BLD-MCNC: 11.8 GM/DL (ref 12–16)
IMM GRANULOCYTES # BLD AUTO: 0.03 10*3/UL (ref 0–0.02)
IMM GRANULOCYTES NFR BLD AUTO: 0.4 % (ref 0–0.43)
LYMPHOCYTES # BLD AUTO: 2.2 X10(3)/MCL (ref 0.5–4.1)
LYMPHOCYTES NFR BLD AUTO: 33 % (ref 15–40)
MAGNESIUM SERPL-MCNC: 1.9 MG/DL (ref 1.8–2.4)
MCH RBC QN AUTO: 29 PG (ref 27–34)
MCHC RBC AUTO-ENTMCNC: 32 GM/DL (ref 31–36)
MCV RBC AUTO: 90 FL (ref 80–99)
MONOCYTES # BLD AUTO: 0.5 X10(3)/MCL (ref 0–1.1)
MONOCYTES NFR BLD AUTO: 8 % (ref 4–12)
NEUTROPHILS # BLD AUTO: 3.8 X10(3)/MCL (ref 1.5–6.9)
NEUTROPHILS NFR BLD AUTO: 56 % (ref 43–75)
PLATELET # BLD AUTO: 210 X10(3)/MCL (ref 140–400)
PMV BLD AUTO: 10.7 FL (ref 6.8–10)
POTASSIUM SERPL-SCNC: 3.7 MMOL/L (ref 3.6–5.2)
PROT SERPL-MCNC: 6 GM/DL (ref 6.4–8.2)
RBC # BLD AUTO: 4.13 X10(6)/MCL (ref 4.2–5.4)
SODIUM SERPL-SCNC: 144 MMOL/L (ref 135–145)
WBC # SPEC AUTO: 6.7 X10(3)/MCL (ref 4.5–11.5)

## 2019-12-09 ENCOUNTER — HISTORICAL (OUTPATIENT)
Dept: LAB | Facility: HOSPITAL | Age: 34
End: 2019-12-09

## 2019-12-09 LAB
HIV 1+2 AB+HIV1 P24 AG SERPL QL IA: NEGATIVE
T PALLIDUM AB SER QL: NORMAL

## 2020-06-04 LAB
BILIRUB SERPL-MCNC: NEGATIVE MG/DL
BLOOD URINE, POC: NEGATIVE
CLARITY, POC UA: CLEAR
COLOR, POC UA: YELLOW
GLUCOSE UR QL STRIP: NORMAL
KETONES UR QL STRIP: NORMAL
LEUKOCYTE EST, POC UA: NORMAL
NITRITE, POC UA: NEGATIVE
PH, POC UA: 7
POC BETA-HCG (QUAL): POSITIVE
PROTEIN, POC: NEGATIVE
SPECIFIC GRAVITY, POC UA: 1.02
UROBILINOGEN, POC UA: NORMAL

## 2020-06-05 ENCOUNTER — HISTORICAL (OUTPATIENT)
Dept: LAB | Facility: HOSPITAL | Age: 35
End: 2020-06-05

## 2021-01-07 ENCOUNTER — HISTORICAL (OUTPATIENT)
Dept: LAB | Facility: HOSPITAL | Age: 36
End: 2021-01-07

## 2021-01-07 LAB
ABS NEUT (OLG): 3.8 X10(3)/MCL (ref 1.5–6.9)
ALBUMIN SERPL-MCNC: 3.5 GM/DL (ref 3.5–5)
ALBUMIN/GLOB SERPL: 1 RATIO (ref 1.1–2)
ALP SERPL-CCNC: 83 UNIT/L (ref 40–150)
ALT SERPL-CCNC: 17 UNIT/L (ref 0–55)
APPEARANCE, UA: ABNORMAL
AST SERPL-CCNC: 19 UNIT/L (ref 5–34)
BILIRUB SERPL-MCNC: 0.3 MG/DL
BILIRUB UR QL STRIP: NEGATIVE
BILIRUBIN DIRECT+TOT PNL SERPL-MCNC: 0.1 MG/DL (ref 0–0.8)
BILIRUBIN DIRECT+TOT PNL SERPL-MCNC: 0.2 MG/DL (ref 0–0.5)
BUN SERPL-MCNC: 17 MG/DL (ref 7–18.7)
CALCIUM SERPL-MCNC: 9.3 MG/DL (ref 8.4–10.2)
CHLORIDE SERPL-SCNC: 99 MMOL/L (ref 98–107)
CHOLEST SERPL-MCNC: 186 MG/DL
CHOLEST/HDLC SERPL: 2 {RATIO} (ref 0–5)
CO2 SERPL-SCNC: 32 MMOL/L (ref 22–29)
COLOR UR: YELLOW
CREAT SERPL-MCNC: 0.84 MG/DL (ref 0.55–1.02)
CREAT UR-MCNC: 150.3 MG/DL (ref 45–106)
ERYTHROCYTE [DISTWIDTH] IN BLOOD BY AUTOMATED COUNT: 13.1 % (ref 11.5–17)
EST. AVERAGE GLUCOSE BLD GHB EST-MCNC: 257.5 MG/DL
GLOBULIN SER-MCNC: 3.5 GM/DL (ref 2.4–3.5)
GLUCOSE (UA): 500 MG/DL
GLUCOSE SERPL-MCNC: 219 MG/DL (ref 74–100)
HBA1C MFR BLD: 10.6 %
HCT VFR BLD AUTO: 41.5 % (ref 36–48)
HDLC SERPL-MCNC: 101 MG/DL (ref 35–60)
HGB BLD-MCNC: 13 GM/DL (ref 12–16)
HGB UR QL STRIP: NEGATIVE
KETONES UR QL STRIP: NEGATIVE MG/DL
LDLC SERPL CALC-MCNC: 76 MG/DL (ref 50–140)
LEUKOCYTE ESTERASE UR QL STRIP: NEGATIVE
MCH RBC QN AUTO: 28 PG (ref 27–34)
MCHC RBC AUTO-ENTMCNC: 31 GM/DL (ref 31–36)
MCV RBC AUTO: 89 FL (ref 80–99)
MICROALBUMIN UR-MCNC: 8.8 UG/ML
MICROALBUMIN/CREAT RATIO PNL UR: 5.9 MG/GM CR (ref 0–30)
NITRITE UR QL STRIP: NEGATIVE
PH UR STRIP: 7 [PH] (ref 4.6–8)
PLATELET # BLD AUTO: 280 X10(3)/MCL (ref 140–400)
PMV BLD AUTO: 10.7 FL (ref 6.8–10)
POTASSIUM SERPL-SCNC: 4.2 MMOL/L (ref 3.5–5.1)
PROT SERPL-MCNC: 7 GM/DL (ref 6.4–8.3)
PROT UR QL STRIP: NEGATIVE MG/DL
RBC # BLD AUTO: 4.65 X10(6)/MCL (ref 4.2–5.4)
SODIUM SERPL-SCNC: 138 MMOL/L (ref 136–145)
SP GR UR STRIP: >=1.03 (ref 1–1.03)
TRIGL SERPL-MCNC: 45 MG/DL (ref 37–140)
TSH SERPL-ACNC: 0.89 UIU/ML (ref 0.35–4.94)
UROBILINOGEN UR STRIP-ACNC: 1 EU/DL
VLDLC SERPL CALC-MCNC: 9 MG/DL
WBC # SPEC AUTO: 6.5 X10(3)/MCL (ref 4.5–11.5)

## 2021-06-09 ENCOUNTER — HISTORICAL (OUTPATIENT)
Dept: LAB | Facility: HOSPITAL | Age: 36
End: 2021-06-09

## 2021-06-09 LAB
ABS NEUT (OLG): 3.3 X10(3)/MCL (ref 1.5–6.9)
ALBUMIN SERPL-MCNC: 3.3 GM/DL (ref 3.5–5)
ALBUMIN/GLOB SERPL: 1 RATIO (ref 1.1–2)
ALP SERPL-CCNC: 80 UNIT/L (ref 40–150)
ALT SERPL-CCNC: 20 UNIT/L (ref 0–55)
AST SERPL-CCNC: 22 UNIT/L (ref 5–34)
BILIRUB SERPL-MCNC: 0.3 MG/DL
BILIRUBIN DIRECT+TOT PNL SERPL-MCNC: 0.1 MG/DL (ref 0–0.8)
BILIRUBIN DIRECT+TOT PNL SERPL-MCNC: 0.2 MG/DL (ref 0–0.5)
BUN SERPL-MCNC: 17 MG/DL (ref 7–18.7)
CALCIUM SERPL-MCNC: 9.2 MG/DL (ref 8.4–10.2)
CHLORIDE SERPL-SCNC: 103 MMOL/L (ref 98–107)
CHOLEST SERPL-MCNC: 187 MG/DL
CHOLEST/HDLC SERPL: 2 {RATIO} (ref 0–5)
CO2 SERPL-SCNC: 29 MMOL/L (ref 22–29)
CREAT SERPL-MCNC: 0.82 MG/DL (ref 0.55–1.02)
CREAT UR-MCNC: 158.4 MG/DL (ref 45–106)
ERYTHROCYTE [DISTWIDTH] IN BLOOD BY AUTOMATED COUNT: 13.6 % (ref 11.5–17)
EST. AVERAGE GLUCOSE BLD GHB EST-MCNC: 271.9 MG/DL
GLOBULIN SER-MCNC: 3.4 GM/DL (ref 2.4–3.5)
GLUCOSE SERPL-MCNC: 221 MG/DL (ref 74–100)
HBA1C MFR BLD: 11.1 %
HCT VFR BLD AUTO: 39.2 % (ref 36–48)
HDLC SERPL-MCNC: 100 MG/DL (ref 35–60)
HGB BLD-MCNC: 12.4 GM/DL (ref 12–16)
IRON SERPL-MCNC: 59 UG/DL (ref 50–170)
LDLC SERPL CALC-MCNC: 77 MG/DL (ref 50–140)
MAGNESIUM SERPL-MCNC: 2 MG/DL (ref 1.6–2.6)
MCH RBC QN AUTO: 28 PG (ref 27–34)
MCHC RBC AUTO-ENTMCNC: 32 GM/DL (ref 31–36)
MCV RBC AUTO: 90 FL (ref 80–99)
PLATELET # BLD AUTO: 238 X10(3)/MCL (ref 140–400)
PMV BLD AUTO: 10.5 FL (ref 6.8–10)
POTASSIUM SERPL-SCNC: 4.2 MMOL/L (ref 3.5–5.1)
PROT SERPL-MCNC: 6.7 GM/DL (ref 6.4–8.3)
PROT UR STRIP-MCNC: 12 MG/DL
RBC # BLD AUTO: 4.37 X10(6)/MCL (ref 4.2–5.4)
SODIUM SERPL-SCNC: 139 MMOL/L (ref 136–145)
T4 FREE SERPL-MCNC: 0.76 NG/DL (ref 0.7–1.48)
TRIGL SERPL-MCNC: 52 MG/DL (ref 37–140)
TSH SERPL-ACNC: 1.51 UIU/ML (ref 0.35–4.94)
VIT B12 SERPL-MCNC: 760 PG/ML (ref 213–816)
VLDLC SERPL CALC-MCNC: 10 MG/DL
WBC # SPEC AUTO: 6 X10(3)/MCL (ref 4.5–11.5)

## 2022-03-24 ENCOUNTER — HISTORICAL (OUTPATIENT)
Dept: ADMINISTRATIVE | Facility: HOSPITAL | Age: 37
End: 2022-03-24

## 2022-03-24 LAB
EST. AVERAGE GLUCOSE BLD GHB EST-MCNC: 260.4 MG/DL
HBA1C MFR BLD: 10.7 %

## 2022-04-11 ENCOUNTER — HISTORICAL (OUTPATIENT)
Dept: ADMINISTRATIVE | Facility: HOSPITAL | Age: 37
End: 2022-04-11
Payer: MEDICAID

## 2022-04-27 VITALS
BODY MASS INDEX: 21.41 KG/M2 | SYSTOLIC BLOOD PRESSURE: 112 MMHG | WEIGHT: 128.5 LBS | HEIGHT: 65 IN | DIASTOLIC BLOOD PRESSURE: 68 MMHG

## 2022-04-30 NOTE — ED PROVIDER NOTES
"   Patient:   Bia Jones            MRN: [id number]            FIN: [id number]               Age:   32 years     Sex:  Female     :  1985   Associated Diagnoses:   Hyperglycemia due to type 1 diabetes mellitus; Diabetic Gastroparesis Associated With Type 1 Diabetes Mellitus; Nausea and vomiting in adult   Author:   Simeon Santiago MD      Basic Information   Time seen: Date & time 2018 10:50:00.   History source: Patient, family.   Arrival mode: Private vehicle, wheelchair.   History limitation: None.   Additional information: Patient's physician(s): Ld PARNELL, Tremaine, I have assummed care of this patient at   1107. DWMMD.      History of Present Illness   The patient presents with abdominal pain, Patient C/O abdominal pain and blurred vision. states that her CBG was over 500 at home. states a history of gastroparesis (pipo, NP).  and 32 year old female with hx of type 1 DM and gastroparesis presents to the ED complaining of hyperglycemia; CBG >500 at home. Reports abdominal pain, blurred vision, n/v/d since Wednesday which are signs of her gastroparesis flare-ups. Denies any dysuria, vaginal bleeding or discharge, blood in emesis or diarrhea. Pt reports that Thursday her feet were swollen, but she kept them elevated and they are back to normal now..  The onset was 4  days ago.  The course/duration of symptoms is constant.  The character of symptoms is "pain".  The degree at onset was moderate.  The Location of pain at onset was diffuse and abdominal.  The degree at present is moderate.  The Location of pain at present is diffuse and abdominal.  Radiating pain: none. The exacerbating factor is none.  The relieving factor is none.  Therapy today: none.  Risk factors consist of diabetes mellitus.  Associated symptoms: nausea, vomiting, diarrhea, no vaginal bleeding or discharge and no hematemesis.  Additional history: none.        Review of Systems   Constitutional symptoms:  " Negative except as documented in HPI.   Skin symptoms:  Negative except as documented in HPI.   Eye symptoms:  Negative except as documented in HPI.   ENMT symptoms:  Negative except as documented in HPI.   Respiratory symptoms:  Negative except as documented in HPI.   Cardiovascular symptoms:  Peripheral edema.   Gastrointestinal symptoms:  Abdominal pain, moderate, diffuse, pain, nausea, vomiting, diarrhea, no hematemesis.    Genitourinary symptoms:  No vaginal bleeding, no vaginal discharge.    Musculoskeletal symptoms:  Negative except as documented in HPI.   Neurologic symptoms:  Negative except as documented in HPI.   Psychiatric symptoms:  Negative except as documented in HPI.   Endocrine symptoms:  Negative except as documented in HPI.   Hematologic/Lymphatic symptoms:  Negative except as documented in HPI.   Allergy/immunologic symptoms:  Negative except as documented in HPI.             Additional review of systems information: All other systems reviewed and otherwise negative.      Health Status   Allergies: No known allergies.   Medications:  (Selected)   Inpatient Medications  Ordered  Reglan: 20 mg, form: Injection, IV Push, Once-NOW, first dose 05/15/17 17:29:00 CDT, stop date 05/15/17 17:29:00 CDT, [phone],[id number]  Prescriptions  Prescribed  Bentyl 10 mg oral capsule: 10 mg = 1 cap(s), Oral, QID, # 28 cap(s), 0 Refill(s), Pharmacy: [id number]  Levsin 0.125 mg oral tablet: 0.125 mg = 1 tab(s), Oral, TIDAC, # 90 tab(s), 0 Refill(s), Pharmacy: [id number]  insulin isophane human recombinant 100 units/mL subcutaneous injection: 10 units, Subcutaneous, TID, before a meal, # 1.5 mL, 10 Refill(s), Pharmacy: [facility] [id number]  Documented Medications  Documented  Creon 36,000-114,000- 180,000 unit capsule,delayed release(DR/EC):   FLUCONAZOLE 150MG TABLETS: 150 mg = 1 tab(s), Oral, Daily  HYOSCYAMINE 0.125MG ORAL DIS TABS: 0.125 mg = 1 tab(s), Oral, TID  Humalog 100 units/mL  subcutaneous injection: 5 units, Subcutaneous, With Meals, sliding scale, # 10 mL, 0 Refill(s)  JUNEL FE  TABLETS 28S: 1 tab(s), Oral, Daily  KETOCONAZOLE 2% CREAM 60GM: 1 tamy, TOP, BID  LANTUS 100/ML INJ: 55 units, Subcutaneous, At Bedtime  MIRTAZAPINE 30MG OD TAB: 30 mg = 1 tab(s), Oral, qPM  NITROFURANTOIN MONO/MAC 100MG CAPS: 100 mg = 1 cap(s), Oral, BID  Percocet 5/325: 1 tab(s), Oral, q4hr, PRN PRN for pain, 0 Refill(s)  Phenergan 12.5 mg Tab: 12.5 mg = 1 tab(s), Oral, q6hr, PRN PRN for nausea, 0 Refill(s)  TRAMADOL 50MG TABLETS:   gabapentin 300 mg oral capsule: 600 mg = 2 cap(s), Oral, TID, 0 Refill(s)  insulin regular: 10 units = 0.1 mL, Subcutaneous, AC, 0 Refill(s).   Immunizations: no tetanus up to date, no influenza, no pneumococcal.   Menstrual history: Last menstrual period: 2 months(s) ago, Saw GYN about missed period last month. Is not pregnant..   Pregnancy history:  2, para 2.      Past Medical/ Family/ Social History   Medical history:    Active  Diabetic peripheral neuropathy ([id number])  Resolved  Diabetes mellitus type 1 ([id number]--[id number]):  Resolved.  Gastroparesis due to type 1 diabetes mellitus ([id number]):  Resolved..   Surgical history:    Biopsy Gastrointestinal on 3/8/2017 at 31 Years.  Comments:  3/8/2017 14:Jose Davies RN  auto-populated from documented surgical case  Sclerotherapy on 3/8/2017 at 31 Years.  Comments:  3/8/2017 14:Jose Davies RN  auto-populated from documented surgical case  Esophagogastroduodenoscopy on 3/8/2017 at 31 Years.  Comments:  3/8/2017 14:Jose Davies RN  auto-populated from documented surgical case  J tube placement on 2017 at 31 Years.   Section (None) on 2014 at 29 Years.  Comments:  11/10/2014 01:05 - Arnoldo HA, Carla NIEVES.  auto-populated from documented surgical case  gallbladder removed.  wisdom teeth removed.  J tube removal..   Family history:    Diabetes  mellitus type 2  Mother  Hypertension.  Father  .   Social history: Alcohol use: Denies, Tobacco use: Denies, Drug use: Denies, Occupation: Employed, Family/social situation: Unmarried (), Lives with children.      Physical Examination               Vital Signs   Vital Signs   5/6/2018 10:49 CDT       Temperature Oral          36.8 DegC                             Temperature Oral (calculated)             98.24 DegF                             Peripheral Pulse Rate     107 bpm  HI                             Respiratory Rate          20 br/min                             SpO2                      98 %                             Oxygen Therapy            Room air                             Systolic Blood Pressure   132 mmHg                             Diastolic Blood Pressure  82 mmHg  .   Measurements   5/6/2018 10:49 CDT       Weight Dosing             66 kg                             Weight Measured and Calculated in Lbs     145.50 lb                             Weight Estimated          66 kg                             Height/Length Dosing      162.5 cm                             Height/Length Estimated   162.5 cm                             Body Mass Index Estimated 24.99 kg/m2  .   Basic Oxygen Information   5/6/2018 10:49 CDT       SpO2                      98 %                             Oxygen Therapy            Room air  .   General:  Alert, no acute distress, black female, not anxious, not ill-appearing.    Skin:  Warm, dry, no rash, normal for ethnicity.    Head:  Normocephalic, atraumatic.    Neck:  Supple, trachea midline, no tenderness, no JVD, no carotid bruit.    Eye:  Pupils are equal, round and reactive to light, extraocular movements are intact, normal conjunctiva.    Ears, nose, mouth and throat:  Tympanic membranes clear, no pharyngeal erythema or exudate, Mouth: Dry mucous membranes.    Cardiovascular:  No murmur, Normal peripheral perfusion, No edema, Tachycardia.    Respiratory:   Lungs are clear to auscultation, respirations are non-labored, breath sounds are equal.    Chest wall:  No tenderness, No deformity.    Back:  Nontender, Normal range of motion, Normal alignment, no step-offs.    Musculoskeletal:  Normal ROM, normal strength, no tenderness, no swelling, no deformity.    Gastrointestinal:  Soft, Nontender, Non distended, Normal bowel sounds, No organomegaly.    Genitourinary:  No CVA tenderness.   Neurological:  Alert and oriented to person, place, time, and situation, No focal neurological deficit observed, CN II-XII intact, normal motor observed, normal speech observed, normal coordination observed, Diabetic peripheral neuropathy.    Lymphatics:  No lymphadenopathy.   Psychiatric:  Cooperative, appropriate mood & affect, normal judgment, non-suicidal.       Medical Decision Making   Differential Diagnosis:  Abdominal pain, Diabetic ketoacidosis, diabetic gastroparesis, gastroenteritis..    Documents reviewed:  Emergency department nurses' notes.   Pregnancy test:  UCG-negative.   Results review:  Lab results : Lab View   5/6/2018 11:46 CDT       U beta hCG Ql POC         Negative    5/6/2018 11:25 CDT       Sample ABG                art                             Treatment                 ra                             Site                      Radial Rt                             pH Art                    7.400                             pCO2 Art                  42.0 mmHg                             pO2 Art                   106.0 mmHg  HI                             HCO3 Art                  26.0 mmol/L                             CO2 Totl Art              27.3 mmol/L  HI                             O2 Sat Art                98.1 %  HI                             D base                    1.0                             THB ABG                   12.4 gm/dL                             CO Hgb                    1.0 %  NA                             Met Hgb Art                0.2 %  LOW                             O2 Hgb                    96.7 %                             O2 Cont                   17.0 vol%                             CaO2                      17.0 mL/dL                             Ionized Calcium           1.21 mmol/L                             Sodium RT                 128.0 mmol/L  LOW                             Potassium RT              4.80 mmol/L                             Allens                    N/A                             Setting 1 ABG             -                             Setting 2 ABG             -                             Setting 3 ABG             -                             Setting 4 ABG             -    5/6/2018 11:14 CDT       Sodium Lvl                131 mmol/L  LOW                             Potassium Lvl             5.0 mmol/L                             Chloride                  96 mmol/L  LOW                             CO2                       27.0 mmol/L                             Calcium Lvl               9.4 mg/dL                             Glucose Lvl               610 mg/dL  CRIT                             BUN                       16.0 mg/dL                             Creatinine                1.07 mg/dL  HI                             eGFR-AA                   >60 mL/min/1.73 m2  NA                             eGFR-KENNETH                  >60 mL/min/1.73 m2  NA                             Bili Total                0.7 mg/dL                             Bili Direct               0.20 mg/dL                             Bili Indirect             0.50 mg/dL                             AST                       31 unit/L                             ALT                       33 unit/L                             Alk Phos                  120 unit/L                             Total Protein             7.1 gm/dL                             Albumin Lvl               3.30 gm/dL  LOW                             Globulin                   3.80 gm/dL  HI                             A/G Ratio                 0.9 ratio  LOW                             Lipase Lvl                233 unit/L                             BOHB                      10.00 mg/dL  HI                             WBC                       8.4 x10(3)/mcL                             RBC                       4.73 x10(6)/mcL                             Hgb                       12.8 gm/dL                             Hct                       41.7 %                             Platelet                  265 x10(3)/mcL                             MCV                       88.2 fL                             MCH                       27.1 pg                             MCHC                      30.7 gm/dL  LOW                             RDW                       13.7 %                             MPV                       10.9 fL                             Abs Neut                  5.99 x10(3)/mcL                             Neutro Auto               71 %  NA                             Lymph Auto                21 %  NA                             Mono Auto                 6 %  NA                             Eos Auto                  1 %  NA                             Abs Eos                   0.1 x10(3)/mcL                             Basophil Auto             0 %  NA                             Abs Neutro                5.99 x10(3)/mcL                             Abs Lymph                 1.8 x10(3)/mcL                             Abs Mono                  0.5 x10(3)/mcL                             Abs Baso                  0.0 x10(3)/mcL    5/6/2018 10:53 CDT       UA Appear                 CLEAR                             UA Color                  YELLOW                             UA Spec Grav              1.026                             UA Bili                   Negative                             UA pH                     7.0                             UA Urobilinogen            0.2                             UA Blood                  Negative                             UA Glucose                3+                             UA Ketones                1+                             UA Protein                Negative                             UA Nitrite                Negative                             UA Leuk Est               Negative                             UA WBC                    NONE SEEN                             UA RBC                    NONE SEEN                             UA Bacteria               NONE SEEN /HPF                             UA Squam Epithelial       NONE SEEN  .      Reexamination/ Reevaluation   Time: 5/6/2018 13:45:00 .   Vital signs   results included from flowsheet : Vital Signs   5/6/2018 13:08 CDT       Peripheral Pulse Rate     103 bpm  HI                             Heart Rate Monitored      102 bpm  HI                             Respiratory Rate          17 br/min                             SpO2                      100 %                             Oxygen Therapy            Room air                             Systolic Blood Pressure   118 mmHg                             Diastolic Blood Pressure  71 mmHg                             Mean Arterial Pressure, Cuff              87 mmHg     Interventions: 2 rounds of narcotics have been given patient is not in DKA talk to the patient plainly will be admitted and treated for diabetic gastroparesis and hyperglycemia.      Impression and Plan   Diagnosis   Hyperglycemia due to type 1 diabetes mellitus (MKO19-JN E10.65)   Diabetic Gastroparesis Associated With Type 1 Diabetes Mellitus (CRL31-VM E10.43)   Nausea and vomiting in adult (GRW80-OM R11.2)      Calls-Consults   -  5/6/2018 13:28:00 , Ld PARNELL, Tremaine, ok to admit .    Plan   Condition: Unchanged.    Disposition: Admit time  5/6/2018 13:46:00.    Counseled: Patient, Friend, Regarding diagnosis, Regarding diagnostic results,  Regarding treatment plan, Patient indicated understanding of instructions, Friend at bedside understood.    Notes: I, Gustavo Cifuentes, acted solely as a scribe for and in the presence of Dr. Santiago who performed the service., I, Simeon Santiago MD, a physician licensed to practice in this state, have  performed the physical evaluation,  history gathering,  and medical decision making that is reflected in this record..   VERONICA Santiago MD.

## 2022-04-30 NOTE — DISCHARGE SUMMARY
Patient:   Bia Jones            MRN: [id number]            FIN: [id number]               Age:   32 years     Sex:  Female     :  1985   Associated Diagnoses:   None   Author:   Tremaine Jaffe MD      Discharge Information   Discharge Summary Information:  Admitted  2018, Discharged  2018, Admitting diagnosis (1. Nausea with abdpain sec to gastroparesis  2. Uncontrolled type 1 DM  3. Restless leg syndrome  4. PUD  5. Chronic moderate malnutrition).         Admitting physician: Tremaine Jaffe MD.       Physical Examination      Vital Signs (last 24 hrs)_____  Last Charted___________  Temp Oral     37 DegC  (MAY 09 15:24)  Heart Rate Peripheral   100 bpm  (MAY 09 15:)  Resp Rate         14 br/min  (MAY 09 15:24)  SBP      106 mmHg  (MAY 09 15:)  DBP      73 mmHg  (MAY 09 15:)  SpO2      100 %  (MAY 09 15:)     General:  Alert and oriented.    Eye:  Pupils are equal, round and reactive to light, Extraocular movements are intact, Normal conjunctiva, Vision unchanged.    HENT:  Normocephalic, Normal hearing, Oral mucosa is moist, No pharyngeal erythema.    Neck:  Supple, Non-tender, No carotid bruit.    Respiratory:  Lungs are clear to auscultation, Respirations are non-labored, Breath sounds are equal, No chest wall tenderness.    Cardiovascular:  Normal rate, Regular rhythm, No murmur, No gallop.    Gastrointestinal:  Soft, Non-tender, Non-distended, Normal bowel sounds, No organomegaly.    Genitourinary:  No costovertebral angle tenderness, No inguinal tenderness, No urethral discharge.    Lymphatics:  No lymphadenopathy neck, axilla, groin.    Musculoskeletal:  Normal range of motion, Normal strength.    Neurologic:  Alert, Oriented, Normal sensory, Normal motor function, No focal deficits, Cranial Nerves II-XII are grossly intact.    Psychiatric:  Cooperative, Appropriate mood & affect, Normal judgment, Non-suicidal.       Hospital Course   Chief Complaint  "  5/6/2018 10:49 CDT       Abdominal pain, blurry vision, "feeling faint"; CBG at home was over 500; CBG in triage reads "HI"      History of Present Illness   33 y/o AAF well known to me presented to ED with complaints of mid abdominal pain, nausea and vomiting worsening over the few days and had similar admissions in the past and her medical history includes significant type I DM, GASTROPARESIS, NONCOMPLIANCE and further admitted for symptomatic management for gastroparesis. remains nauseated and epigastric pain and better cbg control as sugars seems to be significantly elevated.    Today's info : seen and examined,  nausea improved and tolerated po and cbgs better and reports burning pain and swelling with b/l le   1. Nausea with abdpain sec to gastroparesis  2. Uncontrolled type 1 DM  3. Restless leg syndrome  4. PUD  5. Chronic moderate malnutrition    plan :  CONTINUE WITH IVF  LABS IN AM  ISS  ADV DIET AS TOLERABLE  CONTINUE ALL HER HOME MEDS  SCDS FOR DVT PPX  resume neurontin     dc home on po protonix with close outpt follow up  dc time 31 min      "

## 2022-04-30 NOTE — H&P
Patient:   Bia Jones            MRN: [id number]            FIN: [id number]               Age:   33 years     Sex:  Female     :  1985   Associated Diagnoses:   None   Author:   Ld PARNELL, Tremaine      Basic Information   Source of history:  Self, Medical record.    Present at bedside:  Medical personnel.    Referral source:  Emergency department.    History limitation:  None.       Chief Complaint   2019 8:12 CST       pt to ER via POV for abd pain.  pt states pain and n/v/d and high glucose for approx 2 weeks.   in triage        History of Present Illness   31 y/o AAF well known to me presented to ED with complaints of mid abdominal pain, nausea and vomiting worsening over the few days and had similar admissions in the past and her medical history includes type I DM, GASTROPARESIS, NONCOMPLIANCE and further admitted for ivf and symptomatic management along with cbg control         Review of Systems   Constitutional:  Weakness.    Eye:  Negative.    Ear/Nose/Mouth/Throat:  Negative.    Respiratory:  Shortness of breath.    Cardiovascular:  Palpitations.    Gastrointestinal:  Nausea.    Genitourinary:  Negative.    Hematology/Lymphatics:  Negative.    Endocrine:  Negative.    Immunologic:  Negative.    Musculoskeletal:  Neck pain, Decreased range of motion.    Integumentary:  Negative.    Neurologic:  Alert and oriented X4, Abnormal balance, Numbness, Tingling, Headache.    Psychiatric:  Negative.    All other systems are negative          Health Status   Allergies:    Allergic Reactions (All)  No Known Allergies,    Allergies (1) Active Reaction  No Known Allergies None Documented   Current medications:  (Selected)   Inpatient Medications  Ordered  Dextrose 50% and Water  (50 mL vial/syringe): 25 mL, 12.5 gm =, form: Injection, IV Push, As Directed PRN for blood glucose, Infuse over: 5 minute(s), first dose 19 15:08:00 CST, Unconscious patient: Repeat as ordered per  protocol.  Dextrose 50% and Water  (50 mL vial/syringe): 25 mL, 12.5 gm =, form: Injection, IV Push, Once PRN for blood glucose, Infuse over: 5 minute(s), first dose 01/28/19 15:08:00 CST, Unconscious patient: Look for other source of altered mental status.  Dextrose 50% and Water  (50 mL vial/syringe): 50 mL, 25 gm =, form: Injection, IV Push, As Directed PRN for blood glucose, Infuse over: 5 minute(s), first dose 01/28/19 15:08:00 CST, Unconscious patient: Repeat as ordered per protocol.  Dextrose 50% in Water intravenous solution: 25 mL, 12.5 gm =, form: Injection, IV Push, As Directed PRN for blood glucose, Infuse over: 5 minute(s), first dose 01/28/19 15:08:00 CST, Conscious patient.  Dilaudid: 0.5 mg, form: Injection, IV, q4hr PRN for pain, severe, first dose 01/28/19 15:08:00 CST, severe ( > 7 on pain scale)  IVF Normal Saline NS Bolus 1000ml 1,000 mL: 1,000 mL, 1,000 mL, IV, 1,000 mL, start date 01/28/19 8:15:00 CST  Norco 5 mg-325 mg oral tablet: 1 tab(s), form: Tab, Oral, q6hr PRN for pain, first dose 01/28/19 15:08:00 CST, mild-moderate  Normal Saline (0.9% NS) IV 1,000 mL: 1,000 mL, 1,000 mL, IV, 1,000 mL/hr, start date 01/28/19 10:16:00 CST  Phenergan: 12.5 mg, form: Injection, IV Push, q4hr PRN for nausea/vomiting, first dose 01/28/19 15:08:00 CST  Reglan: 20 mg, form: Injection, IV Push, Once-NOW, first dose 05/15/17 17:29:00 CDT, stop date 05/15/17 17:29:00 CDT, 965,196  Sodium Chloride 0.9% intravenous solution 1,000 mL: 1,000 mL, 1,000 mL, IV, 125 mL/hr, start date 01/28/19 15:08:00 CST  Zofran: 4 mg, form: Injection, IV Push, q4hr PRN for nausea, first dose 01/28/19 15:08:00 CST  acetaminophen: 1,000 mg, form: Tab, Oral, q6hr PRN for pain, mild, first dose 01/28/19 15:08:00 CST  acetaminophen: 650 mg, form: Liquid, Oral, q6hr PRN for fever, first dose 01/28/19 15:08:00 CST,  > 38.1 degrees Celsius (100.6 degrees Fahrenheit)  glucagon: 1 mg, form: Injection, IM, q10min PRN for blood glucose, first  dose 01/28/19 15:08:00 CST, Conscious Patient with NO IV access available and BG < 45 mg/dl.  glucagon: 1 mg, form: Injection, IM, q10min PRN for blood glucose, first dose 01/28/19 15:08:00 CST, Unconscious patient: Patient with NO IV access available and BG < 70 mg/dl.  insulin lispro: 2-14 units, form: Injection, Subcutaneous, As Directed PRN for blood glucose, first dose 01/28/19 15:08:00 CST  morphine 4 mg/mL preservative-free intravenous solution: 4 mg, form: Injection, IV Push, q4hr PRN for pain, severe, first dose 01/28/19 15:08:00 CST, ( > 7 on pain scale)  Prescriptions  Prescribed  insulin isophane human recombinant 100 units/mL subcutaneous injection: 10 units, Subcutaneous, TID, before a meal, # 1.5 mL, 10 Refill(s), Pharmacy: [facility] [id number]  Documented Medications  Documented  ALPRAZOLAM 0.5MG TABLETS:   Creon 36,000-114,000- 180,000 unit capsule,delayed release(DR/EC):   DULOXETINE DR 60MG CAPSULES: 60 mg = 1 cap(s), Oral, Daily  HUMALOG 100 U/ML KWIK PEN INJ 3ML:   Humalog 100 units/mL subcutaneous injection: 5 units, Subcutaneous, With Meals, sliding scale, # 10 mL, 0 Refill(s)  LANTUS 100/ML INJ: 55 units, Subcutaneous, At Bedtime  MIRTAZAPINE 30MG OD TAB: 30 mg = 1 tab(s), Oral, qPM  Percocet 5/325: 1 tab(s), Oral, q4hr, PRN PRN for pain, 0 Refill(s)  Phenergan 12.5 mg Tab: 12.5 mg = 1 tab(s), Oral, q6hr, PRN PRN for nausea, 0 Refill(s)  gabapentin 300 mg oral capsule: 600 mg = 2 cap(s), Oral, TID, 0 Refill(s),    Medications (17) Active  Scheduled: (0)  Continuous: (3)  sodium chloride 0.9% 1,000 mL  Bolus  1,000 mL, IV  sodium chloride 0.9% 1,000 mL  1,000 mL, IV, 1,000 mL/hr  sodium chloride 0.9% 1,000 mL  1,000 mL, IV, 125 mL/hr  PRN: (14)  acetaminophen 500 mg Tab  1,000 mg 2 tab(s), Oral, q6hr  acetaminophen 650 mg/20.3mL Liqu Adult UD  650 mg 20.3 mL, Oral, q6hr  dextrose 50% vial 50 ml  12.5 gm 25 mL, IV Push, As Directed  dextrose 50% vial 50 ml  12.5 gm 25 mL, IV Push,  Once  dextrose 50% vial 50 ml  12.5 gm 25 mL, IV Push, As Directed  dextrose 50% vial 50 ml  25 gm 50 mL, IV Push, As Directed  glucagon recombinant 1 mg Inj  1 mg 1 EA, IM, q10min  glucagon recombinant 1 mg Inj  1 mg 1 EA, IM, q10min  hydrocodone 5mg/APAP 325 mg  1 tab(s), Oral, q6hr  hydromorphone 2 mg/ml Inj (per mL)  0.5 mg 0.25 mL, IV, q4hr  insulin (Humalog) lispro 100 u/ml Inj  2-14 units, Subcutaneous, As Directed  morphine 4 mg/mL preservative-free Soln  4 mg 1 mL, IV Push, q4hr  ondansetron 2 mg/mL inj - 2mL  4 mg 2 mL, IV Push, q4hr  promethazine 25 mg/mL Inj  12.5 mg 0.5 mL, IV Push, q4hr      Histories   Past Medical History:    Active  Diabetic peripheral neuropathy ([id number])  Resolved  Diabetes mellitus type 1 ([id number]):  Resolved.  Gastroparesis due to type 1 diabetes mellitus ([id number]):  Resolved.   Family History:    Diabetes mellitus type 2  Mother  Hypertension.  Father     Procedure history:    Biopsy Gastrointestinal performed by Ayan Hong MD on 3/8/2017 at 31 Years.  Comments:  3/8/2017 14:32 - Jose Fu RN  auto-populated from documented surgical case  Sclerotherapy performed by Ayan Hong MD on 3/8/2017 at 31 Years.  Comments:  3/8/2017 14:32 - Jose Fu RN  auto-populated from documented surgical case  Esophagogastroduodenoscopy performed by Ayan Hong MD on 3/8/2017 at 31 Years.  Comments:  3/8/2017 14:32 - Jose Fu RN  auto-populated from documented surgical case  J tube placement on 2017 at 31 Years.   Section (None) performed by Rajesh Cantrell MD on 2014 at 29 Years.  Comments:  11/10/2014 01:05 - Carla Mclaughlin RN  auto-populated from documented surgical case  gallbladder removed.  wisdom teeth removed.  J tube removal.   Social History        Social & Psychosocial Habits    Alcohol  2014 Risk Assessment: Denies Alcohol Use    2016  Use: Never    10/14/2017  Use:  Current    Frequency: 1-2 times per year    Home/Environment  12/29/2016  Lives with: Children    Living situation: Home/Independent    Nutrition/Health  04/06/2017  Diet description: GLUCERNA    Substance Abuse  08/27/2014 Risk Assessment: Denies Substance Abuse    05/11/2016  Use: Never    Tobacco  08/27/2014 Risk Assessment: Denies Tobacco Use    05/11/2016  Use: Never smoker    12/10/2018  Use: Never smoker    Patient Wants Consult For Cessation Counseling N/A    01/28/2019  Use: Never (less than 100 in l    Patient Wants Consult For Cessation Counseling N/A.        Physical Examination   General:  Alert and oriented.    Eye:  Pupils are equal, round and reactive to light, Extraocular movements are intact, Normal conjunctiva, Vision unchanged.    HENT:  Normocephalic, Normal hearing, Oral mucosa is moist, No pharyngeal erythema.    Neck:  Supple, Non-tender, No carotid bruit.    Respiratory:  Lungs are clear to auscultation, Respirations are non-labored, Breath sounds are equal, No chest wall tenderness.    Cardiovascular:  Normal rate, Regular rhythm, No murmur, No gallop.    Gastrointestinal:  Soft, Non-tender, Non-distended, Normal bowel sounds, No organomegaly.    Genitourinary:  No costovertebral angle tenderness, No inguinal tenderness, No urethral discharge.       Vital Signs (last 24 hrs)_____  Last Charted___________  Temp Oral     36.7 DegC  (JAN 29 07:16)  Heart Rate Peripheral   79 bpm  (JAN 29 07:16)  Resp Rate         18 br/min  (JAN 29 07:16)  SBP      115 mmHg  (JAN 29 07:16)  DBP      79 mmHg  (JAN 29 07:16)  SpO2      95 %  (JAN 29 07:16)   Lymphatics:  No lymphadenopathy neck, axilla, groin.    Musculoskeletal:  Normal range of motion, Normal strength.    Neurologic:  Alert, Oriented, Normal sensory, Normal motor function, No focal deficits, Cranial Nerves II-XII are grossly intact.    Psychiatric:  Cooperative, Appropriate mood & affect, Normal judgment, Non-suicidal.       Review /  Management   Results review:     Labs (Last four charted values)  WBC                  8.2 (JAN 28)   Hgb                  13.6 (JAN 28)   Hct                  42.4 (JAN 28)   Plt                  256 (JAN 28)   Na                   143 (JAN 29) L 134 (JAN 28)   K                    4.2 (JAN 29) 4.6 (JAN 28)   CO2                  27.0 (JAN 29) 30.0 (JAN 28)   Cl                   H 109 (JAN 29) 99 (JAN 28)   Cr                   0.83 (JAN 29) 0.93 (JAN 28)   BUN                  12.0 (JAN 29) 13.0 (JAN 28)   Glucose Random       H 298 (JAN 29) H 552 (JAN 28) .    Laboratory Results      Impression and Plan   1. Gastroparesis  2. Uncontrolled type 1 DM  3. Restless leg syndrome  4. PUD  5. Chronic moderate malnutrition    plan :  CONTINUE WITH IVF  LABS IN AM  ISS  ADV DIET AS TOLERABLE  RESUME ALL HER HOME MEDS  SCDS FOR DVT PPX  code status full  adm time 74 min

## 2022-04-30 NOTE — ED PROVIDER NOTES
Patient:   Bia Jones            MRN: [id number]            FIN: [id number]               Age:   33 years     Sex:  Female     :  1985   Associated Diagnoses:   Acute hyperglycemia; Gastroparesis   Author:   Jose PARNELL, Korey PEÑA      Basic Information   Time seen: Date & time 2019 08:13:00.   History source: Patient.   Arrival mode: Walking.   History limitation: None.   Additional information: Patient's physician(s): Ld PARNELL, Tremaine.      History of Present Illness   The patient presents with hyperglycemia, SORT:  Female with hyperglycemia and abd pain.  Claire MAURO and     I, Dr. Garcia, assumed care of this patient at 0821.   32 y/o AAF with a history of DM1, gastroparesis, peripheral neuropathy presents to the ED with complaints of hyperglycemia, abd pain, n/v/d. Pt reports that she started having abdominal pain is sharp in nature. pain is in her LUQ 2 weeks ago, feels nauseated, has diarrhea, and has been vomiting. Pt states that walking and moving makes her abdominal pain worse. .  The onset was 2  weeks ago.  The course/duration of symptoms is constant.  Risk factors consist of diabetes mellitus.  Prior episodes: none.  Therapy today: prescription medications including Phenergan, Insulin, gabapentin.  Associated symptoms: nausea, vomiting and   Diarrhea  Abdominal pain.  Additional history: none.        Review of Systems   Constitutional symptoms:  Negative except as documented in HPI.   Skin symptoms:  Negative except as documented in HPI.   Eye symptoms:  Negative except as documented in HPI.   ENMT symptoms:  Negative except as documented in HPI.   Respiratory symptoms:  Negative except as documented in HPI.   Cardiovascular symptoms:  Negative except as documented in HPI.   Gastrointestinal symptoms:  Left upper quadrant, nausea, vomiting, diarrhea.    Genitourinary symptoms:  Negative except as documented in HPI.   Musculoskeletal symptoms:  Negative except as  documented in HPI.   Neurologic symptoms:  Negative except as documented in HPI.   , hyperglycemia.    Health Status   Allergies: No known allergies.   Medications:  (Selected)   Inpatient Medications  Ordered  Reglan: 20 mg, form: Injection, IV Push, Once-NOW, first dose 05/15/17 17:29:00 CDT, stop date 05/15/17 17:29:00 CDT, 965,196  Prescriptions  Prescribed  Levsin 0.125 mg oral tablet: 0.125 mg = 1 tab(s), Oral, TIDAC, # 90 tab(s), 0 Refill(s), Pharmacy: [id number]  Reglan 10 mg oral tablet: 10 mg = 1 tab(s), Oral, QID, # 120 tab(s), 0 Refill(s), Pharmacy: [facility] [id number]  cholestyramine 4 g/5.7 g oral powder for reconstitution: 4 gm, Oral, BID, # 60 EA, 0 Refill(s), Pharmacy: [facility] [id number]  insulin isophane human recombinant 100 units/mL subcutaneous injection: 10 units, Subcutaneous, TID, before a meal, # 1.5 mL, 10 Refill(s), Pharmacy: [facility] [id number]  Documented Medications  Documented  ALPRAZOLAM 0.5MG TABLETS:   AMOXICILLIN 500MG CAPSULES: 500 mg = 1 cap(s), Oral, q8hr  Creon 36,000-114,000- 180,000 unit capsule,delayed release(DR/EC):   HUMALOG 100 U/ML KWIK PEN INJ 3ML:   Humalog 100 units/mL subcutaneous injection: 5 units, Subcutaneous, With Meals, sliding scale, # 10 mL, 0 Refill(s)  LANTUS 100/ML INJ: 55 units, Subcutaneous, At Bedtime  MIRTAZAPINE 30MG OD TAB: 30 mg = 1 tab(s), Oral, qPM  Percocet 5/325: 1 tab(s), Oral, q4hr, PRN PRN for pain, 0 Refill(s)  Phenergan 12.5 mg Tab: 12.5 mg = 1 tab(s), Oral, q6hr, PRN PRN for nausea, 0 Refill(s)  Protonix 40 mg ORAL enteric coated tablet: 40 mg = 1 tab(s), Oral, Daily, 0 Refill(s)  gabapentin 300 mg oral capsule: 600 mg = 2 cap(s), Oral, TID, 0 Refill(s).      Past Medical/ Family/ Social History   Medical history:    Active  Diabetic peripheral neuropathy ([id number])  Resolved  Diabetes mellitus type 1 ([id number]):  Resolved.  Gastroparesis due to type 1 diabetes mellitus  ([id number]):  Resolved..   Surgical history:    Biopsy Gastrointestinal on 3/8/2017 at 31 Years.  Comments:  3/8/2017 14:32 - Jose Fu RN  auto-populated from documented surgical case  Sclerotherapy on 3/8/2017 at 31 Years.  Comments:  3/8/2017 14:32 - Jose Fu RN  auto-populated from documented surgical case  Esophagogastroduodenoscopy on 3/8/2017 at 31 Years.  Comments:  3/8/2017 14:32 - Jose Fu RN  auto-populated from documented surgical case  J tube placement on 2017 at 31 Years.   Section (None) on 2014 at 29 Years.  Comments:  11/10/2014 01:Carla Zavala RN  auto-populated from documented surgical case  gallbladder removed.  wisdom teeth removed.  J tube removal..   Family history:    Diabetes mellitus type 2  Mother  Hypertension.  Father  .   Social history:    Social & Psychosocial Habits    Alcohol  2014 Risk Assessment: Denies Alcohol Use    2016  Use: Never    10/14/2017  Use: Current    Frequency: 1-2 times per year    Home/Environment  2016  Lives with: Children    Living situation: Home/Independent    Nutrition/Health  2017  Diet description: GLUCERNA    Substance Abuse  2014 Risk Assessment: Denies Substance Abuse    2016  Use: Never    Tobacco  2014 Risk Assessment: Denies Tobacco Use    2016  Use: Never smoker    12/10/2018  Use: Never smoker    Patient Wants Consult For Cessation Counseling N/A.      Physical Examination               Vital Signs   Vital Signs   2019 8:12 CST       Temperature Oral          36.7 DegC                             Temperature Oral (calculated)             98.06 DegF                             Peripheral Pulse Rate     96 bpm                             Respiratory Rate          18 br/min                             SpO2                      100 %                             Oxygen Therapy            Room air                             Systolic  Blood Pressure   127 mmHg                             Diastolic Blood Pressure  76 mmHg  .   Basic Oxygen Information   1/28/2019 8:12 CST       SpO2                      100 %                             Oxygen Therapy            Room air  .   General:  Alert, no acute distress.    Skin:  Warm, dry, intact   Head:  Normocephalic, atraumatic   Eye:     Cardiovascular:  Regular rate and rhythm, Normal peripheral perfusion, No edema   Respiratory:  Lungs are clear to auscultation, respirations are non-labored, breath sounds are equal, Symmetrical chest wall expansion.    Gastrointestinal:  Soft, Non distended, Normal bowel sounds, Tenderness: Mild, left upper quadrant, Guarding: Negative, Rebound: Negative.    Musculoskeletal:  No deformity.   Neurological:  Alert and oriented to person, place, time, and situation, No focal neurological deficit observed, normal speech observed.    Psychiatric:  Cooperative, appropriate mood & affect.       Medical Decision Making   Differential Diagnosis::  Diabetes, diabetic ketoacidosis, hyperglycemia.    Rationale:  Hospital quadrant pain with nausea vomiting diarrhea history of diabetes and gastroparesis.  No anion gap does have sugar in the 500s 2 L of fluids have been given insulin administered.  Pain and nausea addressed while.   Documents reviewed:  Emergency department nurses' notes.   Orders  Launch Order Profile (Selected)   Inpatient Orders  Ordered  Blood Glucose Monitoring POC: 01/28/19 8:14:00 CST, Once-Unscheduled, 01/28/19 8:14:00 CST  IVF Normal Saline NS Bolus 1000ml 1,000 mL: 1,000 mL, 1,000 mL, IV, 1,000 mL, start date 01/28/19 8:15:00 CST  Saline Lock Insert: 01/28/19 8:14:00 CST, Stop date 01/28/19 8:14:00 CST  Ordered (Dispatched)  CBC w/ Auto Diff: Now collect, 01/28/19 8:14:00 CST, Blood, Stop date 01/28/19 8:15:00 CST, Lab Collect, Print Label By Order Location, 01/28/19 8:14:00 CST  CMP: Stat collect, 01/28/19 8:14:00 CST, Blood, Stop date 01/28/19 8:15:00  CST, Lab Collect, Print Label By Order Location, 01/28/19 8:14:00 CST  Lipase Level: Stat collect, 01/28/19 8:15:00 CST, Blood, Stop date 01/28/19 8:15:00 CST, Lab Collect, Print Label By Order Location, 01/28/19 8:15:00 CST  Urinalysis Complete a reflex to culture: Stat collect, Urine, 01/28/19 8:14:00 CST, Stop date 01/28/19 8:15:00 CST, Nurse collect, Print Label By Order Location  Ordered (In-Lab)  POC Urine Pregnancy Test ED: Urine, Stat collect, Collected, 01/28/19 8:14:00 CST by Dariel Rodriguez, Stop date 01/28/19 8:14:00 CST, Nurse collect, Print Label By Order Location  Completed  POC CBG: Blood, Stat collect, Collected, 01/28/19 8:16:46 CST  hydromorphone: 2 mg, 1 mL, Injection, N/A, Once, Stop date 01/28/19 8:45:36 CST, Physician Stop, 01/28/19 8:45:36 CST  ondansetron INJ: 4 mg, 2 mL, Injection, N/A, Once, Stop date 01/28/19 8:45:48 CST, Physician Stop, 01/28/19 8:45:48 CST.   Results review:  Lab results : Lab View   1/28/2019 10:05 CST      U beta hCG Ql POC         Negative    1/28/2019 9:47 CST       Sodium Lvl                134 mmol/L  LOW                             Potassium Lvl             4.6 mmol/L                             Chloride                  99 mmol/L                             CO2                       30.0 mmol/L                             Calcium Lvl               8.7 mg/dL                             Glucose Lvl               552 mg/dL  HI                             BUN                       13.0 mg/dL                             Creatinine                0.93 mg/dL                             eGFR-AA                   >60 mL/min/1.73 m2  NA                             eGFR-KENNETH                  >60 mL/min/1.73 m2  NA                             Bili Total                0.5 mg/dL                             Bili Direct               0.10 mg/dL                             Bili Indirect             0.40 mg/dL                             AST                       39 unit/L   HI                             ALT                       18 unit/L                             Alk Phos                  95 unit/L                             Total Protein             7.0 gm/dL                             Albumin Lvl               3.50 gm/dL                             Globulin                  3.50 gm/dL                             A/G Ratio                 1.0 ratio  LOW                             Restick                   Done    1/28/2019 9:40 CST       UA Appear                 CLEAR                             UA Color                  YELLOW                             UA Spec Grav              1.033  HI                             UA Bili                   Negative                             UA pH                     8.0                             UA Urobilinogen           0.2                             UA Blood                  3+                             UA Glucose                3+                             UA Ketones                1+                             UA Protein                Negative                             UA Nitrite                Negative                             UA Leuk Est               Negative                             UA WBC                    NONE SEEN                             UA RBC                    116 /HPF  HI                             UA Bacteria               NONE SEEN /HPF                             UA Squam Epithelial       NONE SEEN    1/28/2019 8:40 CST       Lipase Lvl                106 unit/L                             WBC                       8.2 x10(3)/mcL                             RBC                       4.84 x10(6)/mcL                             Hgb                       13.6 gm/dL                             Hct                       42.4 %                             Platelet                  256 x10(3)/mcL                             MCV                       87.6 fL                             MCH                        28.1 pg                             MCHC                      32.1 gm/dL  LOW                             RDW                       12.5 %                             MPV                       11.2 fL                             Abs Neut                  5.55 x10(3)/mcL                             Neutro Auto               68 %  NA                             Lymph Auto                22 %  NA                             Mono Auto                 7 %  NA                             Eos Auto                  2 %  NA                             Abs Eos                   0.2 x10(3)/mcL                             Basophil Auto             0 %  NA                             Abs Neutro                5.55 x10(3)/mcL                             Abs Lymph                 1.8 x10(3)/mcL                             Abs Mono                  0.6 x10(3)/mcL                             Abs Baso                  0.0 x10(3)/mcL  .      Reexamination/ Reevaluation   Assessment: Had some localized hives at the site of IV after Dilaudid.  She was given Benadryl for this..   Assessment: Sugar 485 after 10 units of insulin and 2 L of normal saline we'll give additional insulin IV fluids have talked to her primary provider who will admit her.      Impression and Plan   Diagnosis   Acute hyperglycemia ([id number]-EP R73.9)   Gastroparesis (PAM37-BO K31.84)      Calls-Consults   -  Ld PARNELL, Tremaine.   Plan   Condition: Improved, Stable.    Disposition: Admit to Inpatient Unit.    Counseled: Patient, Regarding diagnosis, Regarding diagnostic results, Regarding treatment plan, Patient indicated understanding of instructions.    Notes: I, Amelia LeJeune, acted solely as a scribe for and in the presence of Dr. Garcia who performed the service. , I, Dr Garcia have read note from scribe and I agree with history and physical except as amended by me.  All information was dictated from my history and my examination of patient..

## 2022-04-30 NOTE — H&P
"   Patient:   Bia Jones            MRN: [id number]            FIN: [id number]               Age:   32 years     Sex:  Female     :  1985   Associated Diagnoses:   None   Author:   Ld PARNELL, Tremaine      Basic Information   Source of history:  Self.    Present at bedside:  Medical personnel.    Referral source:  Self, Emergency department.    History limitation:  None.       Chief Complaint   2018 10:49 CDT       Abdominal pain, blurry vision, "feeling faint"; CBG at home was over 500; CBG in triage reads "HI"      History of Present Illness   33 y/o AAF well known to me presented to ED with complaints of mid abdominal pain, nausea and vomiting worsening over the few days and had similar admissions in the past and her medical history includes significant type I DM, GASTROPARESIS, NONCOMPLIANCE and further admitted for symptomatic management for gastroparesis. remains nauseated and epigastric pain and better cbg control as sugars seems to be significantly elevated         Review of Systems   Constitutional:  Weakness.    Eye:  Negative.    Ear/Nose/Mouth/Throat:  Negative.    Respiratory:  Negative.    Cardiovascular:  Palpitations.    Gastrointestinal:  Nausea, Vomiting.    Genitourinary:  Negative.    Hematology/Lymphatics:  Negative.    Endocrine:  Negative.    Immunologic:  Negative.    Musculoskeletal:  Neck pain, Decreased range of motion.    Integumentary:  Negative.    Neurologic:  Alert and oriented X4, Abnormal balance, Numbness, Tingling, Headache.    Psychiatric:  Negative.    All other systems are negative      Health Status   Current medications:    Medications (12) Active  Scheduled: (2)  Dextrose 50% in Water intravenous solution  50 mL, IV Push, As Directed  pantoprazole 40 mg Inj  40 mg 1 EA, IV Slow, Daily  Continuous: (2)  sodium chloride 0.9% 1,000 mL  1,000 mL, IV, 150 mL/hr  sodium chloride 0.9% 2,000 mL  2,000 mL, IV  PRN: (8)  Dextrose 50% in Water intravenous " solution  25 mL, IV Push, As Directed  Dextrose 50% in Water intravenous solution  25 mL, IV Push, Once  Dextrose 50% in Water intravenous solution  25 mL, IV Push, As Directed  glucagon recombinant 1 mg Inj  1 mg 1 EA, IM, q10min  glucagon recombinant 1 mg Inj  1 mg 1 EA, IM, q10min  insulin (Humalog) lispro 100 u/ml Inj  3-21 units, Subcutaneous, As Directed  morphine 10 mg/ml Inj - 1 mL  10 mg 1 mL, IV Push, q4hr  promethazine 25 mg/mL Inj  12.5 mg 0.5 mL, IV Push, q4hr        Histories   Past Medical History:    Active  Diabetic peripheral neuropathy ([id number])  Resolved  Diabetes mellitus type 1 ([id number]):  Resolved.  Gastroparesis due to type 1 diabetes mellitus ([id number]):  Resolved.   Family History:    Diabetes mellitus type 2  Mother  Hypertension.  Father     Social History        Social & Psychosocial Habits    Alcohol  08/27/2014 Risk Assessment: Denies Alcohol Use    05/11/2016  Use: Never    10/14/2017  Use: Current    Frequency: 1-2 times per year    Home/Environment  12/29/2016  Lives with: Children    Living situation: Home/Independent    Nutrition/Health  04/06/2017  Diet description: GLUCERNA    Substance Abuse  08/27/2014 Risk Assessment: Denies Substance Abuse    05/11/2016  Use: Never    Tobacco  08/27/2014 Risk Assessment: Denies Tobacco Use    05/11/2016  Use: Never smoker  .        Physical Examination   General:  Alert and oriented.    Eye:  Pupils are equal, round and reactive to light, Extraocular movements are intact, Normal conjunctiva, Vision unchanged.    HENT:  Normocephalic, Normal hearing, Oral mucosa is moist, No pharyngeal erythema.    Neck:  Supple, Non-tender, No carotid bruit.    Respiratory:  Lungs are clear to auscultation, Respirations are non-labored, Breath sounds are equal, No chest wall tenderness.    Cardiovascular:  Normal rate, Regular rhythm, No murmur, No gallop.    Gastrointestinal:  Soft, Non-tender, Non-distended, Normal bowel  sounds, No organomegaly.    Genitourinary:  No costovertebral angle tenderness, No inguinal tenderness, No urethral discharge.       Vital Signs (last 24 hrs)_____  Last Charted___________  Temp Oral     36.4 DegC  (MAY 06 15:01)  Heart Rate Peripheral   97 bpm  (MAY 06 15:01)  Resp Rate         18 br/min  (MAY 06 15:01)  SBP      H 154mmHg  (MAY 06 15:01)  DBP      74 mmHg  (MAY 06 15:01)  SpO2      99 %  (MAY 06 15:01)  Weight      48.7 kg  (MAY 06 14:55)  Height      162.5 cm  (MAY 06 14:55)  BMI      18.44  (MAY 06 14:55)     Lymphatics:  No lymphadenopathy neck, axilla, groin.    Musculoskeletal:  Normal range of motion, Normal strength.    Neurologic:  Alert, Oriented, Normal sensory, Normal motor function, No focal deficits, Cranial Nerves II-XII are grossly intact.    Psychiatric:  Cooperative, Appropriate mood & affect, Normal judgment, Non-suicidal.       Review / Management   Results review:     Labs (Last four charted values)  Glucose              C 610 (MAY 06) .    Laboratory Results   Today's Lab Results : PowerNote Discrete Results   5/6/2018 11:25 CDT       Sample ABG                art                             Treatment                 ra                             Site                      Radial Rt                             pH Art                    7.400                             pCO2 Art                  42.0 mmHg                             pO2 Art                   106.0 mmHg  HI                             HCO3 Art                  26.0 mmol/L                             CO2 Totl Art              27.3 mmol/L  HI                             O2 Sat Art                98.1 %  HI                             D base                    1.0                             THB ABG                   12.4 gm/dL                             CO Hgb                    1.0 %  NA                             Met Hgb Art               0.2 %  LOW                             O2 Hgb                    96.7 %                              O2 Cont                   17.0 vol%                             CaO2                      17.0 mL/dL                             Ionized Calcium           1.21 mmol/L                             Sodium RT                 128.0 mmol/L  LOW                             Potassium RT              4.80 mmol/L                             Allens                    N/A                             Setting 1 ABG             -                             Setting 2 ABG             -                             Setting 3 ABG             -                             Setting 4 ABG             -    5/6/2018 11:14 CDT       WBC                       8.4 x10(3)/mcL                             RBC                       4.73 x10(6)/mcL                             Hgb                       12.8 gm/dL                             Hct                       41.7 %                             Platelet                  265 x10(3)/mcL                             MCV                       88.2 fL                             MCH                       27.1 pg                             MCHC                      30.7 gm/dL  LOW                             RDW                       13.7 %                             MPV                       10.9 fL                             Abs Neut                  5.99 x10(3)/mcL                             Neutro Auto               71 %  NA                             Lymph Auto                21 %  NA                             Mono Auto                 6 %  NA                             Eos Auto                  1 %  NA                             Abs Eos                   0.1 x10(3)/mcL                             Basophil Auto             0 %  NA                             Abs Neutro                5.99 x10(3)/mcL                             Abs Lymph                 1.8 x10(3)/mcL                             Abs Mono                  0.5 x10(3)/mcL                              Abs Baso                  0.0 x10(3)/mcL                             Sodium Lvl                131 mmol/L  LOW                             Potassium Lvl             5.0 mmol/L                             Chloride                  96 mmol/L  LOW                             CO2                       27.0 mmol/L                             Calcium Lvl               9.4 mg/dL                             Glucose Lvl               610 mg/dL  CRIT                             BUN                       16.0 mg/dL                             Creatinine                1.07 mg/dL  HI                             eGFR-AA                   >60 mL/min/1.73 m2  NA                             eGFR-KENNETH                  >60 mL/min/1.73 m2  NA                             Bili Total                0.7 mg/dL                             Bili Direct               0.20 mg/dL                             Bili Indirect             0.50 mg/dL                             AST                       31 unit/L                             ALT                       33 unit/L                             Alk Phos                  120 unit/L                             Total Protein             7.1 gm/dL                             Albumin Lvl               3.30 gm/dL  LOW                             Globulin                  3.80 gm/dL  HI                             A/G Ratio                 0.9 ratio  LOW                             Lipase Lvl                233 unit/L                             BOHB                      10.00 mg/dL  HI    5/6/2018 10:54 CDT       POC CBG                   >600 mg/dL  CRIT    5/6/2018 10:53 CDT       UA Appear                 CLEAR                             UA Color                  YELLOW                             UA Spec Grav              1.026                             UA Bili                   Negative                             UA pH                     7.0                             UA Urobilinogen            0.2                             UA Blood                  Negative                             UA Glucose                3+                             UA Ketones                1+                             UA Protein                Negative                             UA Nitrite                Negative                             UA Leuk Est               Negative                             UA WBC                    NONE SEEN                             UA RBC                    NONE SEEN                             UA Bacteria               NONE SEEN /HPF                             UA Squam Epithelial       NONE SEEN           Impression and Plan   1. Nausea with abdpain sec to gastroparesis  2. Uncontrolled type 1 DM  3. Restless leg syndrome  4. PUD  5. Chronic moderate malnutrition    plan :  CONTINUE WITH IVF  LABS IN AM  ISS  ADV DIET AS TOLERABLE  RESUME ALL HER HOME MEDS  SCDS FOR DVT PPX  code status full  adm time 74 min

## 2022-04-30 NOTE — DISCHARGE SUMMARY
* Final Report *  Progress Note, Acute Care     Patient:   Bia Jones            MRN: [id number]            FIN: [id number]               Age:   32 years     Sex:  Female     :  1985   Associated Diagnoses:   None   Author:   Ld PARNELL, Tremaine      Basic Information   Chief Complaint   10/13/2017 22:53 CDT     Left ABD pain with N/V x 3 episodes. weakness. Admitted on 10/10/17 for DM, Gastroparesis. Discharged today. States not improving at home. CBG--379 in triage. Hx: DKA in past.        History of Present Illness   32 y/o AAF well known to me presented to ED with complaints of mid abdominal pain, nausea and vomiting worsening over the few hours after her recent discharge from the hospital for simialr problem and her medical history includes significant type I DM, GASTROPARESIS, NONCOMPLIANCE and further admitted for symptomatic management for gastroparesis. remains nauseated and epigastric pain    Today's info : tolerating po, still reports pain and nausea better         Review of Systems   Constitutional:  Sweats, Weakness.    Eye:  Negative.    Ear/Nose/Mouth/Throat:  Negative.    Respiratory:  Negative.    Cardiovascular:  Negative.    Gastrointestinal:  Nausea, Vomiting.    Genitourinary:  Negative.    Hematology/Lymphatics:  Negative.    Endocrine:  Negative.    Immunologic:  Negative.    Musculoskeletal:  Negative.    Integumentary:  Negative.    Neurologic:  Negative.    Psychiatric:  Negative.              Health Status   Allergies:    Allergies (1) Active Reaction  No Known Allergies None Documented     Current medications:    Medications (18) Active  Scheduled: (3)  gabapentin 300 mg Cap UD  600 mg 2 cap(s), Oral, TID  insulin (LanTUS) glargine 100 u/ml Sol PEN  55 units 0.55 mL, Subcutaneous, At Bedtime  ranitidine  50 mg 2 mL, IV Piggyback, q8hr  Continuous: (2)  sodium chloride 0.9% 1,000 mL  1,000 mL, IV, 1,000 mL/hr  sodium chloride 0.9% 1,000 mL  1,000 mL,  IV, 150 mL/hr  PRN: (13)  Dextrose 10% in Water Soln - 250 mL  12.5 gm 125 mL, IV, As Directed  Dextrose 10% in Water Soln - 250 mL  12.5 gm 125 mL, IV, As Directed  Dextrose 10% in Water Soln - 250 mL  12.5 gm 125 mL, IV, Once  Dextrose 10% in Water Soln - 250 mL  25 gm 250 mL, IV, As Directed  diPHENnhydraMINE 50 mg/mL Inj  25 mg 0.5 mL, IV, [id number]  glucagon recombinant 1 mg Inj  1 mg 1 EA, IM, q10min  glucagon recombinant 1 mg Inj  1 mg 1 EA, IM, q10min  hydromorphone 2 mg/ml Inj (per mL)  1 mg 0.5 mL, IV Push, q4hr  ibuprofen 400 mg Tab UD  400 mg 1 tab(s), Oral, q6hr  insulin (Humalog) lispro 100 u/ml Inj  2-14 units, Subcutaneous, As Directed  labetalol 5 mg/mL 20mL vial  20 mg 4 mL, IV Push, q2hr  ondansetron 2 mg/mL inj - 2mL  4 mg 2 mL, IV Push, q4hr  promethazine  25 mg 1 mL, IV Piggyback, q4hr        Physical Examination      Vital Signs (last 24 hrs)_____  Last Charted___________  Temp Oral     37.2 DegC  (OCT 15 19:00)  Heart Rate Peripheral   H 101bpm  (OCT 15 19:00)  Resp Rate         20 br/min  (OCT 15 19:00)  SBP      122 mmHg  (OCT 15 19:00)  DBP      78 mmHg  (OCT 15 19:00)  SpO2      100 %  (OCT 15 19:00)     General:  Alert and oriented, No acute distress.    Eye:  Pupils are equal, round and reactive to light, Extraocular movements are intact.    HENT:  Normocephalic, Normal hearing.    Neck:  Supple, Non-tender.    Respiratory:  Lungs are clear to auscultation, Respirations are non-labored, Breath sounds are equal.    Cardiovascular:  Normal rate, Regular rhythm, No murmur.    Gastrointestinal:  Soft, Non-tender, Normal bowel sounds.       Vital Signs (last 24 hrs)_____  Last Charted___________  Temp Oral     36.7 DegC  (OCT 14 20:12)  Heart Rate Peripheral   98 bpm  (OCT 14 20:12)  Resp Rate         18 br/min  (OCT 14 01:00)  SBP      136 mmHg  (OCT 14 20:12)  DBP      89 mmHg  (OCT 14 20:12)  SpO2      96 %  (OCT 14 15:34)     Genitourinary:  No costovertebral angle tenderness.     Musculoskeletal:  Normal range of motion.    Integumentary:  Warm, Pink.    Neurologic:  Alert, Oriented, No focal deficits.           Review / Management   Laboratory Results   Today's Lab Results : PowerNote Discrete Results   10/15/2017 6:06 CDT      WBC                       7.4 x10(3)/mcL                             RBC                       4.17 x10(6)/mcL  LOW                             Hgb                       11.6 gm/dL  LOW                             Hct                       36.9 %  LOW                             Platelet                  208 x10(3)/mcL                             MCV                       88.5 fL                             MCH                       27.8 pg                             MCHC                      31.4 gm/dL  LOW                             RDW                       13.7 %                             MPV                       9.9 fL                             Abs Neut                  3.84 x10(3)/mcL                             Neutro Auto               52 %  NA                             Lymph Auto                36 %  NA                             Mono Auto                 8 %  NA                             Eos Auto                  3 %  NA                             Abs Eos                   0.2 x10(3)/mcL                             Basophil Auto             0 %  NA                             Abs Neutro                3.84 x10(3)/mcL                             Abs Lymph                 2.6 x10(3)/mcL                             Abs Mono                  0.6 x10(3)/mcL                             Abs Baso                  0.0 x10(3)/mcL                             Sodium Lvl                138 mmol/L                             Potassium Lvl             4.3 mmol/L                             Chloride                  104 mmol/L                             CO2                       28.0 mmol/L                             Calcium Lvl               8.3 mg/dL   LOW                             Glucose Lvl               249 mg/dL  HI                             BUN                       13.0 mg/dL                             Creatinine                0.64 mg/dL                             eGFR-AA                   >60 mL/min/1.73 m2  NA                             eGFR-KENNETH                  >60 mL/min/1.73 m2  NA           Impression and Plan   1. Nausea with abdpain sec to gastroparesis  2. Uncontrolled type 1 DM  3. Restless leg syndrome  4. PUD  5. Chronic moderate malnutrition    plan :  CONTINUE WITH IVF  LABS IN AM  ISS  ADV DIET AS TOLERABLE  RESUME ALL HER HOME MEDS  SCDS FOR DVT PPX  code status full      dispo : dc in am if tolerates po better     Result type: Progress Note-Physician  Result date: October 15, 2017 22:42 CDT  Result status: Auth (Verified)  Result title: Progress Note, Acute Care  Performed by: Tremaine Jaffe MD on October 15, 2017 22:44 CDT  Verified by: Tremaine Jaffe MD on October 15, 2017 22:44 CDT  Encounter info: [id number], [facility], Observation, 10/13/2017 - 10/16/2017

## 2022-04-30 NOTE — ED PROVIDER NOTES
Patient:   Bia Jones            MRN: [id number]            FIN: [id number]               Age:   34 years     Sex:  Female     :  1985   Associated Diagnoses:   Dehydration; Type 1 diabetes mellitus with hyperglycemia   Author:   Edin Rosenthal DO      Basic Information   Time seen: Date & time 2019 13:59:00.   History source: Patient.   Arrival mode: Private vehicle.   History limitation: None.   Additional information: Chief Complaint from Nursing Triage Note : Chief Complaint   2019 13:52 CDT      Chief Complaint           c/o nausea, weakness, and high blood sugar since yesterday,  in triage  .      History of Present Illness   The patient presents with 34-year-old female with a history of type 1 diabetes reports to ED today complaining of hyperglycemia and abdominal pain. Patient states that she's been feeling poorly for the past 2-3 days. She states that she has never really had her blood sugars under reasonable control. She's been insulin-dependent as a type I diabetic for the last 6 years. Patient states that she also has some nausea. She denies vomiting..  The onset was 4  days ago.  The course/duration of symptoms is constant and worsening.  Risk factors consist of diabetes mellitus.  Prior episodes: frequent.  Therapy today: none.  Associated symptoms: nausea, polydipsia and denies vomiting.        Review of Systems   Constitutional symptoms:  Weakness, fatigue, no fever, no chills, no sweats.    Skin symptoms:  No jaundice, no rash, no breakdown, no lesion.    Eye symptoms:  No recent vision problems, no pain, no discharge, no icterus, no diplopia, no blurred vision.    ENMT symptoms:  No ear pain, no sore throat, no nasal congestion.    Respiratory symptoms:  No shortness of breath, no cough.    Cardiovascular symptoms:  No chest pain, no palpitations, no peripheral edema.    Gastrointestinal symptoms:  Abdominal pain, nausea, no vomiting, no  diarrhea, no constipation.    Genitourinary symptoms:  No dysuria,    Musculoskeletal symptoms:  No back pain, no Muscle pain.    Neurologic symptoms:  No headache,    Psychiatric symptoms:  No anxiety,    Endocrine symptoms:  Polyuria, polydipsia, hyperglycemia, No hypoglycemia,    Hematologic/Lymphatic symptoms:  Bleeding tendency negative, bruising tendency negative, no gum bleeding.    Allergy/immunologic symptoms:  No seasonal allergies, no recurrent infections.              Additional review of systems information: All other systems reviewed and otherwise negative.      Health Status   Allergies:    Allergic Reactions (Selected)  No Known Allergies,    Allergies (1) Active Reaction  No Known Allergies None Documented  .   Medications:  (Selected)   Inpatient Medications  Ordered  Normal Saline (0.9% NS) IV 1,000 mL: 1,000 mL, 1,000 mL, IV, 999 mL/hr, start date 08/25/19 14:02:00 CDT, 1.59, m2  Reglan: 20 mg, form: Injection, IV Push, Once-NOW, first dose 05/15/17 17:29:00 CDT, stop date 05/15/17 17:29:00 CDT, [id number]  Prescriptions  Prescribed  Phenergan 12.5 mg Tab: 12.5 mg = 1 tab(s), Oral, q6hr, PRN PRN for nausea, # 30 tab(s), 0 Refill(s), Pharmacy: [facility] [id number]  Victoza 18 mg/3 mL subcutaneous injection: 1.2 mg, Subcutaneous, Daily, # 6 mL, 0 Refill(s), Pharmacy: [facility] [id number]  insulin isophane human recombinant 100 units/mL subcutaneous injection: 10 units, Subcutaneous, TID, before a meal, # 1.5 mL, 10 Refill(s), Pharmacy: [facility] [id number]  Documented Medications  Documented  ALPRAZOLAM 0.5MG TABLETS:   Creon 36,000-114,000- 180,000 unit capsule,delayed release(DR/EC):   DULOXETINE DR 60MG CAPSULES: 60 mg = 1 cap(s), Oral, Daily  HUMALOG 100 U/ML KWIK PEN INJ 3ML:   Humalog 100 units/mL subcutaneous injection: 5 units, Subcutaneous, With Meals, sliding scale, # 10 mL, 0 Refill(s)  LANTUS 100/ML INJ: 55 units, Subcutaneous, At Bedtime  MIRTAZAPINE 30MG OD TAB: 30 mg =  1 tab(s), Oral, qPM  Toradol 10 mg oral tablet: 10 mg = 1 tab(s), Oral, TID  cefprozil 500 mg oral tablet: 500 mg = 1 tab(s), Oral, BID  fluconazole 150 mg oral tablet: 150 mg = 1 tab(s), Oral, Once  gabapentin 300 mg oral capsule: 600 mg = 2 cap(s), Oral, TID, 0 Refill(s)  griseofulvin microcrystalline 500 mg oral tablet: 500 mg = 1 tab(s), Oral, Daily  ketoconazole 2% topical shampoo: 1 tamy, TOP, Once  nystatin-triamcinolone 100,000 units/g-0.1% topical cream: 1 tamy, TOP, TID  sulfamethoxazole-trimethoprim 800 mg-160 mg oral tablet: 1 tab(s), Oral, BID.      Past Medical/ Family/ Social History   Medical history:    Active  Diabetic peripheral neuropathy ([id number])  Resolved  Diabetes mellitus type 1 ([id number]):  Resolved.  Gastroparesis due to type 1 diabetes mellitus ([id number]):  Resolved., Reviewed as documented in chart.   Surgical history:    Biopsy Gastrointestinal on 3/8/2017 at 31 Years.  Comments:  3/8/2017 14:32 Jose Otoole RN  auto-populated from documented surgical case  Sclerotherapy on 3/8/2017 at 31 Years.  Comments:  3/8/2017 14:32 Jose Otoole RN  auto-populated from documented surgical case  Esophagogastroduodenoscopy on 3/8/2017 at 31 Years.  Comments:  3/8/2017 14:32 Jose Otoole RN  auto-populated from documented surgical case  J tube placement on 2017 at 31 Years.   Section (None) on 2014 at 29 Years.  Comments:  11/10/2014 1:05 Carla May RN  auto-populated from documented surgical case  gallbladder removed.  wisdom teeth removed.  J tube removal., Reviewed as documented in chart.   Family history:    Diabetes mellitus type 2  Mother  Hypertension.  Father  , Reviewed as documented in chart.   Social history:    Social & Psychosocial Habits    Alcohol  2014 Risk Assessment: Denies Alcohol Use    2016  Use: Never    10/14/2017  Use: Current    Frequency: 1-2 times per  year    Home/Environment  12/29/2016  Lives with: Children    Living situation: Home/Independent    Nutrition/Health  04/06/2017  Diet description: GLUCERNA    Substance Use  08/27/2014 Risk Assessment: Denies Substance Abuse    05/11/2016  Use: Never    Tobacco  08/27/2014 Risk Assessment: Denies Tobacco Use    08/25/2019  Use: Never (less than 100 in l    Patient Wants Consult For Cessation Counseling N/A    Abuse/Neglect  08/25/2019  SHX Any signs of abuse or neglect No  , Alcohol use: Denies, Tobacco use: Denies, Drug use: Denies.   Problem list:    Active Problems (5)  Anxiety disorder   At risk of pressure sore   Diabetic ketoacidosis   Diabetic peripheral neuropathy   Morbid obesity   .      Physical Examination               Vital Signs   Vital Signs   8/25/2019 16:39 CDT      Peripheral Pulse Rate     79 bpm                             Respiratory Rate          18 br/min                             SpO2                      100 %                             Oxygen Therapy            Room air                             Systolic Blood Pressure   99 mmHg                             Diastolic Blood Pressure  62 mmHg    8/25/2019 15:59 CDT      Peripheral Pulse Rate     76 bpm                             Respiratory Rate          16 br/min                             SpO2                      100 %                             Oxygen Therapy            Room air                             Systolic Blood Pressure   115 mmHg                             Diastolic Blood Pressure  67 mmHg    8/25/2019 13:52 CDT      Temperature Oral          36.7 DegC                             Temperature Oral (calculated)             98.06 DegF                             Peripheral Pulse Rate     78 bpm                             Respiratory Rate          16 br/min                             SpO2                      100 %                             Oxygen Therapy            Room air                             Systolic Blood  Pressure   119 mmHg                             Diastolic Blood Pressure  78 mmHg  .      Vital Signs (last 24 hrs)_____  Last Charted___________  Temp Oral     36.7 DegC  (AUG 25 13:52)  Heart Rate Peripheral   79 bpm  (AUG 25 16:39)  Resp Rate         18 br/min  (AUG 25 16:39)  SBP      99 mmHg  (AUG 25 16:39)  DBP      62 mmHg  (AUG 25 16:39)  SpO2      100 %  (AUG 25 16:39)  Weight      59.8 kg  (AUG 25 13:52)  Height      156 cm  (AUG 25 13:52)  BMI      24.57  (AUG 25 13:52)  .   Measurements   8/25/2019 13:52 CDT      Weight Dosing             59.8 kg                             Weight Measured           59.8 kg                             Weight Measured and Calculated in Lbs     131.84 lb                             Height/Length Dosing      156 cm                             Height/Length Measured    156 cm                             Body Mass Index Measured  24.57 kg/m2  .   Basic Oxygen Information   8/25/2019 16:39 CDT      SpO2                      100 %                             Oxygen Therapy            Room air    8/25/2019 15:59 CDT      SpO2                      100 %                             Oxygen Therapy            Room air    8/25/2019 13:52 CDT      SpO2                      100 %                             Oxygen Therapy            Room air  .   General:  Alert, no acute distress.    Skin:  Warm, dry, intact.    Head:  Normocephalic, atraumatic.    Neck:  Supple, trachea midline, no JVD.    Eye:  Pupils are equal, round and reactive to light.   Cardiovascular:  Regular rate and rhythm, No murmur, Normal peripheral perfusion.    Respiratory:  Lungs are clear to auscultation, respirations are non-labored, breath sounds are equal.    Chest wall:  No tenderness.   Back:  Nontender.   Musculoskeletal:  Normal ROM.   Gastrointestinal:  Soft, Nontender, Non distended, Normal bowel sounds.    Neurological:  Alert and oriented to person, place, time, and situation, No focal neurological  deficit observed.    Lymphatics:  No lymphadenopathy.   Psychiatric:  Cooperative, appropriate mood & affect, normal judgment.       Medical Decision Making   Documents reviewed:  Emergency department records, prior records.    Orders  Launch Order Profile (Selected)   Inpatient Orders  Ordered  Normal Saline (0.9% NS) IV 1,000 mL: 1,000 mL, 1,000 mL, IV, 999 mL/hr, start date 08/25/19 14:02:00 CDT, 1.59, m2  Normal Saline (0.9% NS) IV 1,000 mL: 1,000 mL, 1,000 mL, IV, 999 mL/hr, start date 08/25/19 16:52:00 CDT, 1.59, m2  insulin regular: 10 units, form: Injection, IV, Now, first dose 08/25/19 16:52:00 CDT, stop date 08/25/19 16:52:00 CDT, STAT  Ordered (In-Lab)  Amylase Level: Stat collect, 08/25/19 16:52:00 CDT, Blood, Stop date 08/25/19 16:52:00 CDT, Lab Collect, 08/25/19 16:52:00 CDT  Lipase Level: Stat collect, 08/25/19 16:52:00 CDT, Blood, Stop date 08/25/19 16:52:00 CDT, Lab Collect, 08/25/19 16:52:00 CDT  Completed  ABG Request POC: Stat collect, Arterial Blood, 08/25/19 14:01:00 CDT, Once, Stop date 08/25/19 14:02:00 CDT  Automated Diff: NOW collect, 08/25/19 14:23:00 CDT, Blood, Collected, Stop date 08/25/19 14:23:00 CDT, Lab Collect, 08/25/19 14:01:00 CDT  BMP: STAT collect, 08/25/19 16:20:24 CDT, BLOOD, Collected, Stop date 08/25/19 16:20:00 CDT, Lab Collect  CBC w/ Auto Diff: NOW collect, 08/25/19 14:23:38 CDT, BLOOD, Collected, Stop date 08/25/19 14:23:00 CDT, Lab Collect  CMP: STAT collect, 08/25/19 14:23:38 CDT, BLOOD, Collected, Stop date 08/25/19 14:23:00 CDT, Lab Collect  Estimated Glomerular Filtration Rate: STAT collect, 08/25/19 14:23:00 CDT, Blood, Collected, Stop date 08/25/19 14:23:00 CDT, Lab Collect, 08/25/19 14:01:00 CDT  Estimated Glomerular Filtration Rate: STAT collect, 08/25/19 16:20:00 CDT, Blood, Collected, Stop date 08/25/19 16:20:00 CDT, Lab Collect, 08/25/19 15:58:00 CDT  POC CBG: Blood, Routine collect, Collected, 08/25/19 13:58:14 CDT  POC IStat CG3: Blood, Routine collect,  Collected, 08/25/19 14:19:53 CDT  TSH: STAT collect, 08/25/19 14:23:38 CDT, BLOOD, Collected, Stop date 08/25/19 14:23:00 CDT, Lab Collect  UA with Microscopic if Indicated: Stat collect, Urine, 08/25/19 14:02:00 CDT, Stop date 08/25/19 14:02:00 CDT, Nurse collect  XR Chest 1 View: Stat, 08/25/19 14:24:00 CDT, Dyspnea, None, Wheelchair, Patient Has IV?, Rad Type, Not Scheduled, 08/25/19 14:24:00 CDT  hCG Quantitative - Tumor marker- LC: STAT collect, 08/25/19 14:23:38 CDT, BLOOD, Collected, Stop date 08/25/19 14:02:00 CDT, Lab Collect  insulin regular: 10 units, form: Injection, IV, Now, first dose 08/25/19 14:02:00 CDT, stop date 08/25/19 14:02:00 CDT, STAT  promethazine: 25 mg, form: Injection, IV, AdHoc, first dose 08/25/19 14:42:00 CDT, stop date 08/25/19 14:42:00 CDT.   Results review:  Lab results : Lab View   8/25/2019 16:20 CDT      Sodium Lvl                139 mmol/L                             Potassium Lvl             4.1 mmol/L                             Chloride                  105 mmol/L                             CO2                       26 mmol/L                             Calcium Lvl               7.7 mg/dL  LOW                             Glucose Lvl               363 mg/dL  HI                             BUN                       10 mg/dL                             Creatinine                0.87 mg/dL                             BUN/Creat Ratio           11  NA                             eGFR-AA                   >60 mL/min/1.73 m2  NA                             eGFR-KENNETH                  >60 mL/min/1.73 m2  NA    8/25/2019 14:40 CDT      UA Appear                 SL CLOUDY                             UA Color                  STRAW                             UA Spec Grav              <=1.005                             UA Bili                   Negative                             UA pH                     7.0  NA                             UA Urobilinogen           0.2 EU/dL                              UA Blood                  Negative                             UA Glucose                >=1000 mg/dL                             UA Ketones                Negative                             UA Protein                Negative                             UA Nitrite                Negative                             UA Leuk Est               Negative    8/25/2019 14:23 CDT      Sodium Lvl                131 mmol/L  LOW                             Potassium Lvl             5.1 mmol/L                             Chloride                  96 mmol/L  LOW                             CO2                       27 mmol/L                             Calcium Lvl               9.0 mg/dL                             Glucose Lvl               616 mg/dL  CRIT                             BUN                       12 mg/dL                             Creatinine                1.12 mg/dL                             eGFR-AA                   >60 mL/min/1.73 m2  NA                             eGFR-KENNETH                  60 mL/min/1.73 m2  NA                             Bili Total                0.2 mg/dL                             Bili Direct               0.10 mg/dL                             Bili Indirect             0.10 mg/dL  NA                             AST                       25 unit/L                             ALT                       27 unit/L                             Alk Phos                  113 unit/L                             Total Protein             8.2 gm/dL                             Albumin Lvl               3.5 gm/dL                             Globulin                  4.70 gm/dL  NA                             A/G Ratio                 0.7 ratio  NA                             Beta hCG Qnt              1 mIU/mL  NA                             TSH                       1.581 mIU/mL                             WBC                       6.5 x10(3)/mcL                             RBC                        4.92 x10(6)/mcL                             Hgb                       13.9 gm/dL                             Hct                       44.7 %                             Platelet                  220 x10(3)/mcL                             MCV                       91 fL                             MCH                       28 pg                             MCHC                      31 gm/dL                             RDW                       12.7 %                             MPV                       10.7 fL  HI                             Abs Neut                  4.3 x10(3)/mcL                             Neutro Auto               66 %                             Lymph Auto                26 %                             Mono Auto                 6 %                             Eos Auto                  2 %                             Abs Eos                   0.1 x10(3)/mcL                             Basophil Auto             0 %                             Abs Neutro                4.3 x10(3)/mcL                             Abs Lymph                 1.7 x10(3)/mcL                             Abs Mono                  0.4 x10(3)/mcL                             Abs Baso                  0.0 x10(3)/mcL                             IG%                       0.200 %                             IG#                       0.0100    8/25/2019 14:19 CDT      POC pH                    7.336  LOW                             POC pCO2                  44.3 mmHg                             POC pO2                   108.0 mmHg  HI                             POC HCO3                  23.7 mmol/L                             POC TCO2                  25.0 mmol/L                             POC sO2                   98.0 %  HI                             POC BE                    -2 mmol/L    8/25/2019 14:15 CDT      Sample ABG                ARTERIAL                             Treatment                 R/A                              Site                      Brachial Rt                             Allens                    N/A  ,    Labs (Last four charted values)  WBC                  6.5 (AUG 25)   Hgb                  13.9 (AUG 25)   Hct                  44.7 (AUG 25)   Plt                  220 (AUG 25)   Na                   139 (AUG 25) L 131 (AUG 25)   K                    4.1 (AUG 25) 5.1 (AUG 25)   CO2                  26 (AUG 25) 27 (AUG 25)   Cl                   105 (AUG 25) L 96 (AUG 25)   Cr                   0.87 (AUG 25) 1.12 (AUG 25)   BUN                  10 (AUG 25) 12 (AUG 25)   Glucose Random       H 363 (AUG 25) C 616 (AUG 25) , All Results   8/25/2019 13:52 CDT      Triage Note                                            Weight Dosing             59.8 kg                             Weight Measured           59.8 kg                             Weight Measured and Calculated in Lbs     131.84 lb                             Height/Length Dosing      156 cm                             Height/Length Measured    156 cm                             Body Mass Index Measured  24.57 kg/m2                             Weight Loss Surgery History               No                             Temperature Oral          36.7 DegC                             Temperature Oral (calculated)             98.06 DegF                             Peripheral Pulse Rate     78 bpm                             Respiratory Rate          16 br/min                             SpO2                      100 %                             Oxygen Therapy            Room air                             Systolic Blood Pressure   119 mmHg                             Diastolic Blood Pressure  78 mmHg                             Level of Consciousness    Alert                             Eye Opening Response Nicole              Spontaneously                             Best Verbal Response Nicole              Oriented                              Best Motor Response Tiffin               Obeys simple commands                             Nicole Coma Score        15                             Domestic Concerns         None                             Translation Needed        No  .    Radiology results:  Rad Results (ST)  < 12 hrs   Accession: [id number]  Order: XR Chest 1 View  Report Dt/Tm: 08/25/2019 15:01  Report:      INDICATION: Dyspnea     COMPARISON: Chest radiograph from 3/3/2018     FINDINGS:     Single portable AP view of the chest obtained. The cardiomediastinal  silhouette is normal. No pulmonary edema is present. There is no focal  consolidation, pleural effusion or pneumothorax.     IMPRESSION:::     1.  No evidence of acute cardiopulmonary abnormality.      .      Reexamination/ Reevaluation   Vital signs   Basic Oxygen Information   8/25/2019 16:39 CDT      SpO2                      100 %                             Oxygen Therapy            Room air    8/25/2019 15:59 CDT      SpO2                      100 %                             Oxygen Therapy            Room air    8/25/2019 13:52 CDT      SpO2                      100 %                             Oxygen Therapy            Room air        Impression and Plan   Diagnosis   Dehydration ([id number] E86.0)   Type 1 diabetes mellitus with hyperglycemia ([id number] E10.65)      Calls-Consults   -  8/25/2019 16:50:00 , Regulo Buck MD, recommends Admit for glycemic control.    Plan   Disposition: Admit time  8/25/2019 17:00:00, Admit to Inpatient Unit.    Counseled: Patient, Regarding diagnosis, Regarding treatment plan, Patient indicated understanding of instructions.    Orders: Launch Orders   Admit/Transfer/Discharge:  Admit as Inpatient (Order): 8/25/2019 17:00 CDT, Medical Unit [facility]ielo PARNELL, Regulo MCCULLOUGH, No.

## 2022-04-30 NOTE — ED PROVIDER NOTES
Patient:   Bia Jones            MRN: [id number]            FIN: [id number]               Age:   32 years     Sex:  Female     :  1985   Associated Diagnoses:   Gastroparesis diabeticorum   Author:   Regulo Boucher MD      Basic Information   Time seen: Date & time 10/13/2017 22:53:00.   History source: Patient, family.   Arrival mode: Private vehicle, wheelchair.   History limitation: None.   Additional information: Patient's physician(s): Ld PARNELL, Tremaine.      History of Present Illness   The patient presents with abdominal pain, Patient states left lower abdominal pain with nausea, vomiting, and weakness. states that she was recently admitted and discharged for hyperglycemia and gastroparesis (pipo, NP).  and     I, , assumed care of this patient at 00:18.    31 y/o AAF with history of DM and gastroparesis presents to ED with left lower abdominal pain accompanied by N/V. She was discharged today after being admitted monday for hyperglycemia and gastroparesis even though it was recommended she stay here another day. She has a history of cholecystectomy and Botox for gastroparesis. .  The course/duration of symptoms is constant.  The character of symptoms is dull.  The degree at onset was moderate.  The Location of pain at onset was upper and abdominal.  The degree at present is moderate.  The Location of pain at present is left, lower and abdominal.  Radiating pain: none. The exacerbating factor is none.  The relieving factor is none.  Therapy today: none.  Risk factors consist of diabetes mellitus and gastroparesis.  Associated symptoms: nausea and vomiting.        Review of Systems   Constitutional symptoms:  Negative except as documented in HPI.   Skin symptoms:  Negative except as documented in HPI.   Eye symptoms:  Negative except as documented in HPI.   ENMT symptoms:  Negative except as documented in HPI.   Respiratory symptoms:  Negative except as  documented in HPI.   Cardiovascular symptoms:  Negative except as documented in HPI.   Gastrointestinal symptoms:  Abdominal pain, left lower quadrant, nausea, vomiting.    Genitourinary symptoms:  Negative except as documented in HPI.   Musculoskeletal symptoms:  Negative except as documented in HPI.   Neurologic symptoms:  Negative except as documented in HPI.   Psychiatric symptoms:  Negative except as documented in HPI.   Endocrine symptoms:  Negative except as documented in HPI.   Hematologic/Lymphatic symptoms:  Negative except as documented in HPI.   Allergy/immunologic symptoms:  Negative except as documented in HPI.             Additional review of systems information: All other systems reviewed and otherwise negative.      Health Status   Allergies:    Allergic Reactions (Selected)  No Known Allergies.   Medications:  (Selected)   Inpatient Medications  Ordered  Reglan: 20 mg, form: Injection, IV Push, Once-NOW, first dose 05/15/17 17:29:00 CDT, stop date 05/15/17 17:29:00 CDT, 965,196  Documented Medications  Documented  ALPRAZOLAM 0.5MG TAB: 1 tab, Oral, TID, PRN PRN anxiety  Humalog 100 units/mL subcutaneous injection: 5 units, Subcutaneous, With Meals, sliding scale, # 10 mL, 0 Refill(s)  LANTUS 100/ML INJ: 55 units, Subcutaneous, At Bedtime  MIRTAZAPINE 30MG OD TAB: 30 mg = 1 tab(s), Oral, qPM  Percocet 5/325: 1 tab(s), Oral, q4hr, PRN PRN for pain, 0 Refill(s)  Phenergan 12.5 mg Tab: 12.5 mg = 1 tab(s), Oral, q6hr, PRN PRN for nausea, 0 Refill(s)  gabapentin 300 mg oral capsule: 300 mg = 1 cap(s), Oral, TID, 0 Refill(s).      Past Medical/ Family/ Social History   Medical history:    Active  Diabetic peripheral neuropathy ([id number])  Resolved  Diabetes mellitus type 1 ([id number]--[id number]):  Resolved.  Gastroparesis due to type 1 diabetes mellitus ([id number]):  Resolved..   Surgical history:    Biopsy Gastrointestinal on 3/8/2017 at 31 Years.  Comments:  3/8/2017 14:32 - Tank  Jose HA  auto-populated from documented surgical case  Sclerotherapy on 3/8/2017 at 31 Years.  Comments:  3/8/2017 14:32 - Jose Fu RN  auto-populated from documented surgical case  Esophagogastroduodenoscopy on 3/8/2017 at 31 Years.  Comments:  3/8/2017 14:32 - Jose Fu RN  auto-populated from documented surgical case  J tube placement on 2017 at 31 Years.   Section (None) on 2014 at 29 Years.  Comments:  11/10/2014 01:05 - Carla Mclaughlin RN  auto-populated from documented surgical case  gallbladder removed.  wisdom teeth removed.  J tube removal..   Family history:    Diabetes mellitus type 2  Mother  Hypertension.  Father  .   Social history: Alcohol use: Denies, Tobacco use: Denies, Drug use: Denies.      Physical Examination               Vital Signs   Vital Signs   10/13/2017 8:52 CDT      Temperature Oral          36.4 DegC                             Peripheral Pulse Rate     86 bpm                             Respiratory Rate          16 br/min                             SpO2                      100 %                             Systolic Blood Pressure   103 mmHg                             Diastolic Blood Pressure  65 mmHg                             Mean Arterial Pressure, Cuff              77 mmHg    10/13/2017 7:25 CDT      Heart Rate Monitored      92 bpm    10/13/2017 4:26 CDT      Temperature Oral          36.7 DegC                             Peripheral Pulse Rate     98 bpm                             Respiratory Rate          20 br/min                             SpO2                      98 %                             Systolic Blood Pressure   114 mmHg                             Diastolic Blood Pressure  67 mmHg                             Mean Arterial Pressure, Cuff              83 mmHg    10/13/2017 4:00 CDT      Heart Rate Monitored      94 bpm    10/13/2017 0:00 CDT      Heart Rate Monitored      93 bpm    10/12/2017 23:51 CDT      Temperature Oral          36.5 DegC                             Peripheral Pulse Rate     87 bpm                             Respiratory Rate          18 br/min                             SpO2                      100 %                             Systolic Blood Pressure   122 mmHg                             Diastolic Blood Pressure  75 mmHg                             Mean Arterial Pressure, Cuff              91 mmHg    10/12/2017 20:00 CDT     Temperature Oral          36.8 DegC                             Peripheral Pulse Rate     98 bpm                             Heart Rate Monitored      99 bpm                             SpO2                      95 %                             Systolic Blood Pressure   125 mmHg                             Diastolic Blood Pressure  81 mmHg                             Mean Arterial Pressure, Cuff              96 mmHg    10/12/2017 16:57 CDT     Heart Rate Monitored      97 bpm    10/12/2017 16:46 CDT     Oxygen Therapy            Room air    10/12/2017 15:49 CDT     Temperature Oral          36.9 DegC                             Peripheral Pulse Rate     103 bpm  HI                             SpO2                      99 %                             Systolic Blood Pressure   131 mmHg                             Diastolic Blood Pressure  86 mmHg                             Mean Arterial Pressure, Cuff              101 mmHg    10/12/2017 12:38 CDT     Oxygen Therapy            Room air    10/12/2017 12:07 CDT     Temperature Oral          36.5 DegC                             Peripheral Pulse Rate     97 bpm                             Respiratory Rate          20 br/min                             SpO2                      100 %                             Systolic Blood Pressure   121 mmHg                             Diastolic Blood Pressure  84 mmHg                             Mean Arterial Pressure, Cuff              96 mmHg    10/12/2017 12:07 CDT     Heart Rate  Monitored      98 bpm    10/12/2017 8:07 CDT      Heart Rate Monitored      84 bpm                             Heart Rate Monitored      112 bpm  HI                             SpO2                      In Error %  (In Error)                            Oxygen Therapy            Room air  (Modified)                            Oxygen Flow Rate          In Error L/min  (In Error)   10/12/2017 7:46 CDT      Oxygen Therapy            Room air    10/12/2017 7:37 CDT      Temperature Oral          36.8 DegC                             Peripheral Pulse Rate     94 bpm                             Respiratory Rate          20 br/min                             SpO2                      99 %                             Systolic Blood Pressure   116 mmHg                             Diastolic Blood Pressure  79 mmHg                             Mean Arterial Pressure, Cuff              92 mmHg  .   Measurements   10/13/2017 22:53 CDT     Weight Dosing             54.5 kg                             Weight Measured and Calculated in Lbs     120.15 lb                             Weight Estimated          54.5 kg                             Height/Length Dosing      162.56 cm                             Height/Length Estimated   162.56 cm                             Body Mass Index Estimated 20.62 kg/m2  .   Basic Oxygen Information   10/13/2017 8:52 CDT      SpO2                      100 %    10/13/2017 4:26 CDT      SpO2                      98 %    10/12/2017 23:51 CDT     SpO2                      100 %    10/12/2017 20:00 CDT     SpO2                      95 %    10/12/2017 16:46 CDT     Oxygen Therapy            Room air    10/12/2017 15:49 CDT     SpO2                      99 %    10/12/2017 12:38 CDT     Oxygen Therapy            Room air    10/12/2017 12:07 CDT     SpO2                      100 %    10/12/2017 8:07 CDT      SpO2                      In Error %  (In Error)                            Oxygen Therapy             Room air  (Modified)                            Oxygen Flow Rate          In Error L/min  (In Error)   10/12/2017 7:46 CDT      Oxygen Therapy            Room air    10/12/2017 7:37 CDT      SpO2                      99 %  .   General:  Alert, no acute distress.    Skin:  Warm, dry, pink.    Head:  Normocephalic, atraumatic.    Neck:  Supple, trachea midline, no tenderness, no JVD, no carotid bruit.    Eye:  Pupils are equal, round and reactive to light, extraocular movements are intact, normal conjunctiva, vision unchanged.    Ears, nose, mouth and throat:  Tympanic membranes clear, oral mucosa moist, no pharyngeal erythema or exudate.    Cardiovascular:  No murmur, Normal peripheral perfusion, No edema, Tachycardia.    Respiratory:  Lungs are clear to auscultation, respirations are non-labored, breath sounds are equal, Symmetrical chest wall expansion.    Chest wall:  No tenderness, No deformity.    Back:  Nontender, Normal range of motion, Normal alignment.    Musculoskeletal:  Normal ROM, normal strength, no tenderness, no swelling, no deformity.    Gastrointestinal:  Soft, Non distended, Normal bowel sounds, No organomegaly, Tenderness: Mild, epigastric, Guarding: mild, Rebound: Negative.    Neurological:  Alert and oriented to person, place, time, and situation, No focal neurological deficit observed, CN II-XII intact, normal sensory observed, normal motor observed, normal speech observed, normal coordination observed.    Lymphatics:  No lymphadenopathy.   Psychiatric:  Cooperative, appropriate mood & affect, normal judgment.       Medical Decision Making   Documents reviewed:  Emergency department nurses' notes.   Results review:  Lab results : Lab View   10/14/2017 0:24 CDT      U beta hCG Ql POC         Negative    10/13/2017 23:48 CDT     Sodium Lvl                136 mmol/L                             Potassium Lvl             4.7 mmol/L                             Chloride                  99 mmol/L                              CO2                       28.0 mmol/L                             Calcium Lvl               9.3 mg/dL                             Glucose Lvl               362 mg/dL  HI                             BUN                       11.0 mg/dL                             Creatinine                0.83 mg/dL                             eGFR-AA                   >60 mL/min/1.73 m2  NA                             eGFR-KENNETH                  >60 mL/min/1.73 m2  NA                             Bili Total                0.2 mg/dL                             Bili Direct               0.10 mg/dL                             Bili Indirect             0.10 mg/dL                             AST                       35 unit/L                             ALT                       33 unit/L                             Alk Phos                  125 unit/L                             Total Protein             6.9 gm/dL                             Albumin Lvl               3.20 gm/dL  LOW                             Globulin                  3.70 gm/dL  HI                             A/G Ratio                 0.9  NA                             Lipase Lvl                208 unit/L                             WBC                       8.4 x10(3)/mcL                             RBC                       4.55 x10(6)/mcL                             Hgb                       13.0 gm/dL                             Hct                       40.2 %                             Platelet                  252 x10(3)/mcL                             MCV                       88.4 fL                             MCH                       28.6 pg                             MCHC                      32.3 gm/dL  LOW                             RDW                       13.4 %                             MPV                       10.1 fL                             Abs Neut                  5.57 x10(3)/mcL                             Neutro  Auto               66 %  NA                             Lymph Auto                26 %  NA                             Mono Auto                 6 %  NA                             Eos Auto                  2 %  NA                             Abs Eos                   0.1 x10(3)/mcL                             Basophil Auto             0 %  NA                             Abs Neutro                5.57 x10(3)/mcL                             Abs Lymph                 2.2 x10(3)/mcL                             Abs Mono                  0.5 x10(3)/mcL                             Abs Baso                  0.0 x10(3)/mcL  .      Impression and Plan   Diagnosis   Gastroparesis diabeticorum ([id number] E11.43)      Calls-Consults   -  10/14/2017 00:40:00 , Ld PARNELL, Tremaine, med, phone call, recommends admit, IVF, analgesics, antiemetics.    Plan   Condition: Stable.    Disposition: Admit time  10/14/2017 00:40:00, Admit to Inpatient Unit.    Counseled: Patient, Family, Regarding diagnosis, Regarding diagnostic results, Regarding treatment plan, Patient indicated understanding of instructions.    Notes: I, Diana Gomes, acted solely as a scribe for and in the presence of  who performed the service., I, Dr. Boucher, performed all activities above as documented and I am in full agreement with the above documentation..

## 2022-04-30 NOTE — H&P
Patient:   Bia Jones            MRN: [id number]            FIN: [id number]               Age:   32 years     Sex:  Female     :  1985   Associated Diagnoses:   None   Author:   Ld PARNELL, Tremaine      Basic Information   Source of history:  Self, Medical record.    Present at bedside:  Medical personnel.    Referral source:  Emergency department.    History limitation:  None.       Chief Complaint   10/13/2017 22:53 CDT     Left ABD pain with N/V x 3 episodes. weakness. Admitted on 10/10/17 for DM, Gastroparesis. Discharged today. States not improving at home. CBG--379 in triage. Hx: DKA in past.        History of Present Illness   30 y/o AAF well known to me presented to ED with complaints of mid abdominal pain, nausea and vomiting worsening over the few hours after her recent discharge from the hospital for simialr problem and her medical history includes significant type I DM, GASTROPARESIS, NONCOMPLIANCE and further admitted for symptomatic management for gastroparesis. remains nauseated and epigastric pain         Review of Systems   Constitutional:  Sweats, Weakness.    Eye:  Negative.    Ear/Nose/Mouth/Throat:  Negative.    Respiratory:  Negative.    Cardiovascular:  Negative.    Gastrointestinal:  Nausea, Vomiting.    Genitourinary:  Negative.    Hematology/Lymphatics:  Negative.    Endocrine:  Negative.    Immunologic:  Negative.    Musculoskeletal:  Negative.    Integumentary:  Negative.    Neurologic:  Negative.    Psychiatric:  Negative.       Health Status   Current medications:    Medications (17) Active  Scheduled: (2)  gabapentin 300 mg Cap UD  600 mg 2 cap(s), Oral, TID  ranitidine  50 mg 2 mL, IV Piggyback, q8hr  Continuous: (2)  sodium chloride 0.9% 1,000 mL  1,000 mL, IV, 1,000 mL/hr  sodium chloride 0.9% 1,000 mL  1,000 mL, IV, 150 mL/hr  PRN: (13)  Dextrose 10% in Water Soln - 250 mL  12.5 gm 125 mL, IV, As Directed  Dextrose 10% in Water Soln - 250 mL  12.5 gm  125 mL, IV, As Directed  Dextrose 10% in Water Soln - 250 mL  12.5 gm 125 mL, IV, Once  Dextrose 10% in Water Soln - 250 mL  25 gm 250 mL, IV, As Directed  diPHENnhydraMINE 50 mg/mL Inj  25 mg 0.5 mL, IV, g3as-Dpulc  glucagon recombinant 1 mg Inj  1 mg 1 EA, IM, q10min  glucagon recombinant 1 mg Inj  1 mg 1 EA, IM, q10min  hydromorphone 2 mg/ml Inj (per mL)  1 mg 0.5 mL, IV Push, q4hr  ibuprofen 400 mg Tab UD  400 mg 1 tab(s), Oral, q6hr  insulin (Humalog) lispro 100 u/ml Inj  2-14 units, Subcutaneous, As Directed  labetalol 5 mg/mL 20mL vial  20 mg 4 mL, IV Push, q2hr  ondansetron 2 mg/mL inj - 2mL  4 mg 2 mL, IV Push, q4hr  promethazine  25 mg 1 mL, IV Piggyback, q4hr        Histories   Past Medical History:    Active  Diabetic peripheral neuropathy ([id number])  Resolved  Diabetes mellitus type 1 ([id number]):  Resolved.  Gastroparesis due to type 1 diabetes mellitus ([id number]):  Resolved.   Family History:    Diabetes mellitus type 2  Mother  Hypertension.  Father     Social History        Social & Psychosocial Habits    Alcohol  08/27/2014 Risk Assessment: Denies Alcohol Use    05/11/2016  Use: Never    10/14/2017  Use: Current    Frequency: 1-2 times per year    Home/Environment  12/29/2016  Lives with: Children    Living situation: Home/Independent    Nutrition/Health  04/06/2017  Diet description: GLUCERNA    Substance Abuse  08/27/2014 Risk Assessment: Denies Substance Abuse    05/11/2016  Use: Never    Tobacco  08/27/2014 Risk Assessment: Denies Tobacco Use    05/11/2016  Use: Never smoker  .        Physical Examination   General:  Alert and oriented, No acute distress.    Eye:  Pupils are equal, round and reactive to light, Extraocular movements are intact.    HENT:  Normocephalic, Normal hearing.    Neck:  Supple, Non-tender.    Respiratory:  Lungs are clear to auscultation, Respirations are non-labored, Breath sounds are equal.    Cardiovascular:  Normal rate, Regular rhythm,  No murmur.    Gastrointestinal:  Soft, Non-tender, Normal bowel sounds.       Vital Signs (last 24 hrs)_____  Last Charted___________  Temp Oral     36.7 DegC  (OCT 14 20:12)  Heart Rate Peripheral   98 bpm  (OCT 14 20:12)  Resp Rate         18 br/min  (OCT 14 01:00)  SBP      136 mmHg  (OCT 14 20:12)  DBP      89 mmHg  (OCT 14 20:12)  SpO2      96 %  (OCT 14 15:34)     Genitourinary:  No costovertebral angle tenderness.    Musculoskeletal:  Normal range of motion.    Integumentary:  Warm, Pink.    Neurologic:  Alert, Oriented, No focal deficits.       Review / Management   Results review:     Labs (Last four charted values)  Glucose              H 362 (OCT 13) .    Laboratory Results   Today's Lab Results : PowerNote Discrete Results   10/13/2017 23:48 CDT     WBC                       8.4 x10(3)/mcL                             RBC                       4.55 x10(6)/mcL                             Hgb                       13.0 gm/dL                             Hct                       40.2 %                             Platelet                  252 x10(3)/mcL                             MCV                       88.4 fL                             MCH                       28.6 pg                             MCHC                      32.3 gm/dL  LOW                             RDW                       13.4 %                             MPV                       10.1 fL                             Abs Neut                  5.57 x10(3)/mcL                             Neutro Auto               66 %  NA                             Lymph Auto                26 %  NA                             Mono Auto                 6 %  NA                             Eos Auto                  2 %  NA                             Abs Eos                   0.1 x10(3)/mcL                             Basophil Auto             0 %  NA                             Abs Neutro                5.57 x10(3)/mcL                             Abs  Lymph                 2.2 x10(3)/mcL                             Abs Mono                  0.5 x10(3)/mcL                             Abs Baso                  0.0 x10(3)/mcL                             Sodium Lvl                136 mmol/L                             Potassium Lvl             4.7 mmol/L                             Chloride                  99 mmol/L                             CO2                       28.0 mmol/L                             Calcium Lvl               9.3 mg/dL                             Glucose Lvl               362 mg/dL  HI                             BUN                       11.0 mg/dL                             Creatinine                0.83 mg/dL                             eGFR-AA                   >60 mL/min/1.73 m2  NA                             eGFR-KENNETH                  >60 mL/min/1.73 m2  NA                             Bili Total                0.2 mg/dL                             Bili Direct               0.10 mg/dL                             Bili Indirect             0.10 mg/dL                             AST                       35 unit/L                             ALT                       33 unit/L                             Alk Phos                  125 unit/L                             Total Protein             6.9 gm/dL                             Albumin Lvl               3.20 gm/dL  LOW                             Globulin                  3.70 gm/dL  HI                             A/G Ratio                 0.9  NA                             Lipase Lvl                208 unit/L           Impression and Plan   1. Nausea with abdpain sec to gastroparesis  2. Uncontrolled type 1 DM  3. Restless leg syndrome  4. PUD  5. Chronic moderate malnutrition    plan :  CONTINUE WITH IVF  LABS IN AM  ISS  ADV DIET AS TOLERABLE  RESUME ALL HER HOME MEDS  SCDS FOR DVT PPX  code status full  adm time 74 min

## 2022-04-30 NOTE — DISCHARGE SUMMARY
Patient:   Bia Jones            MRN: [id number]            FIN: [id number]               Age:   33 years     Sex:  Female     :  1985   Associated Diagnoses:   None   Author:   Tremaine Jaffe MD      Discharge Information   Discharge Summary Information:  Admitted  2018, Discharged  2018, Admitting diagnosis (1. Intractible nausea and vomiting  2. Uncontrolled type 1 DM  3. Restless leg syndrome  4. PUD  5. Chronic moderate malnutrition).         Admitting physician: Tremaine Jaffe MD.       Physical Examination      Vital Signs (last 24 hrs)_____  Last Charted___________  Temp Oral     36.8 DegC  (:)  Heart Rate Peripheral   79 bpm  (:)  Resp Rate         18 br/min  (:)  SBP      112 mmHg  (:)  DBP      76 mmHg  (:)  SpO2      99 %  (:)   General:  Alert and oriented.    Eye:  Pupils are equal, round and reactive to light, Extraocular movements are intact, Normal conjunctiva, Vision unchanged.    HENT:  Normocephalic, Normal hearing, Oral mucosa is moist, No pharyngeal erythema.    Neck:  Supple, Non-tender, No carotid bruit.    Respiratory:  Lungs are clear to auscultation, Respirations are non-labored, Breath sounds are equal, No chest wall tenderness.    Cardiovascular:  Normal rate, Regular rhythm, No murmur, No gallop.    Gastrointestinal:  Soft, Non-tender, Non-distended, Normal bowel sounds, No organomegaly.    Genitourinary:  No costovertebral angle tenderness, No inguinal tenderness, No urethral discharge.    Lymphatics:  No lymphadenopathy neck, axilla, groin.    Musculoskeletal:  Normal range of motion, Normal strength.    Neurologic:  Alert, Oriented, Normal sensory, Normal motor function, No focal deficits, Cranial Nerves II-XII are grossly intact.    Psychiatric:  Cooperative, Appropriate mood & affect, Normal judgment, Non-suicidal.       Hospital Course   Chief Complaint   2018 2:27  CDT       Pt states nausea, vomiting, and abdominal pain since Thursday, tonight CBG reading HIGH, took 18 units humalog and 50 units of lantis at 2300 and no change.  in triage  (Modified)      History of Present Illness   32 y/o AAF well known to me presented to ED with complaints of mid abdominal pain, nausea and vomiting worsening over the few days and had similar admissions in the past and her medical history includes significant type I DM, GASTROPARESIS, NONCOMPLIANCE and further admitted for elevated suagrs and ivf along with symptomatic management for nausea    Today's info : seen and examined, tolerating po , cbgs high but controlled    1. Intractible nausea and vomiting  2. Uncontrolled type 1 DM  3. Restless leg syndrome  4. PUD  5. Chronic moderate malnutrition    plan :  CONTINUE WITH IVF  LABS IN AM  ISS  ADV DIET AS TOLERABLE  RESUME ALL HER HOME MEDS  SCDS FOR DVT PPX    dc home with strict diet directions  add victoza oon dc 1.2mg sc daily  dc time 32 min

## 2022-05-05 NOTE — HISTORICAL [facility] CERNER
This is a historical note converted from [employer]. Formatting and pictures may have been removed.  Please reference [employer] for original formatting and attached multimedia. Chief Complaint  c/o nausea, weakness, and high blood sugar since yesterday,  in triage  History of Present Illness  35yo female presents to?the ED c/o epigastric abdominal pain and nausea.? She has known h/o severe gastroparesis due to poorly controlled diabetes.? she has actually had a J-tube in the past do to her gastroparesis.? Blood glucose was 616 upon admit.? she is not currently acidotic.? She denies any vomiting at this time but did have some diarrhea a couple days ago.? No currently having diarrhea.? No fever/chills.? No chest pain or SOB.? She states some polyuria and polydipsia.?Pain is about a 7/10 with cramping.? ?She will be admitted for control of her gastroparesis and CBG control.  Review of Systems  ?  ?????Constitutional: ?No fever, No chills, No sweats,?+ weakness, No fatigue. ?  ?????Eye: ?No double vision, No dry eyes, No photophobia. ?  ?????Ear/Nose/Mouth/Throat: ?No ear pain, No nasal congestion, No sore throat. ?  ?????Respiratory: ?No shortness of breath, No cough, No sputum production, No hemoptysis, No wheezing, No pleuritic pain. ?  ?????Cardiovascular: ?No chest pain, No palpitations, No peripheral edema, No syncope. ?  ?????Gastrointestinal:??+ nausea, No vomiting,?+ diarrhea, No constipation, No heartburn,?+ abdominal pain. ?  ?????Genitourinary: ?No dysuria, No hematuria, No change in urine stream. ?  ?????Hematology/Lymphatics: ?No anemia, No bruising tendency, No bruise, No hemorrhage, No petechiae. ?  ?????Endocrine:??+ excessive thirst,?+ polyuria, No cold intolerance. ?  ?????Immunologic: ?No recurrent fevers, No recurrent infections. ?  ?????Musculoskeletal: ?Joint pain, No back pain, No muscle pain, No decreased range of motion. ?  ?????Integumentary: ?No rash, No pruritus, No petechiae, No skin  lesion. ?  ?????Neurologic: ?Alert and oriented X4, No abnormal balance, No confusion, No tingling. ?  ?????Psychiatric: ?No anxiety, No depression. ?  Physical Exam  Vitals & Measurements  T:?37? ?C (Oral)? TMIN:?36.7? ?C (Oral)? TMAX:?37? ?C (Oral)? HR:?81(Monitored)? RR:?20? BP:?121/75? SpO2:?98%? WT:?59.8?kg? BMI:?24.57?  General: ?Alert and oriented, No acute distress. ?  ??????? ? Appearance: Well nourished. ?  ??????? ? Behavior: Appropriate. ?  ??????? ? Skin: Normal for ethnicity. ?  ?????Eye: ?Pupils are equal, round and reactive to light, Extraocular movements are intact. ?  ?????HENT: ?Normocephalic, Normal hearing, Oral mucosa is dry, No pharyngeal erythema. ?  ?????Neck: ?Supple, Non-tender, No carotid bruit, No jugular venous distention, No lymphadenopathy, No thyromegaly. ?  ?????Respiratory: ?Lungs are clear to auscultation, Breath sounds are equal, No chest wall tenderness. ?  ?????Cardiovascular: ?Normal rate, Regular rhythm, No murmur, No gallop, Good pulses equal in all extremities, Normal peripheral perfusion, No edema. ?  ?????Gastrointestinal: ?Soft, tenderness to the epigastric region and LLQ, Non-distended, decreased bowel sounds, No organomegaly. ?  ?????Genitourinary: ?No costovertebral angle tenderness, No urethral discharge. ?  ?????Lymphatics: ?No lymphadenopathy neck, axilla, groin. ?  ?????Musculoskeletal: ?Normal range of motion, Normal strength, No tenderness, No swelling, Normal gait. ?  ?????Integumentary: ?Warm, Dry, Pink, Intact, No rash. ?  ?????Neurologic: ?Alert, Oriented, Normal sensory, Normal motor function, No focal deficits, Cranial Nerves II-XII are grossly intact. ?  ?????Psychiatric: ?Cooperative, Appropriate mood & affect, Normal judgment. ?  Assessment/Plan  1.?Type 1 diabetes mellitus with hyperglycemia?E10.65  2.?Gastroparesis due to DM?E11.43  3.?Dehydration?E86.0  4.?Weakness or fatigue?[id number]  Orders:  DULoxetine, 60 mg, form:  Cap-DR, Oral, Daily, first dose 08/26/19 9:00:00 CDT  enoxaparin, 40 mg, form: Injection, Subcutaneous, Daily, first dose 08/26/19 9:00:00 CDT  gabapentin, 600 mg, form: Cap, Oral, TID, first dose 08/25/19 22:00:00 CDT  griseofulvin, 500 mg, form: Susp, Oral, Daily, first dose 08/26/19 9:00:00 CDT  insulin isophane (NPH), 10 units, form: Injection, Subcutaneous, TID, first dose 08/25/19 22:00:00 CDT  ketorolac, 30 mg, IV, q6hr PRN for as needed for pain, Infuse over: 20 minute(s), Order duration: 5 day(s), first dose 08/25/19 18:09:00 CDT, stop date 08/30/19 18:08:00 CDT  Template Non-Formulary Med, 1 capsule, form: Cap-DR, Oral, AC, first dose 08/26/19 7:00:00 CDT  Template Non-Formulary Med, 55 units, form: Injection-Insulin, Subcutaneous, At Bedtime, first dose 08/25/19 21:00:00 CDT  Blood Glucose Monitoring POC, 08/25/19 18:04:00 CDT, q4hr, per sliding scale, 08/25/19 19:00:00 CDT  Hemoglobin A1c, Routine collect, 08/26/19 5:00:00 CDT, Blood, Stop date 08/26/19 5:00:00 CDT, Lab Collect, 08/26/19 5:00:00 CDT  Magnesium Level, AM Next collect, 08/26/19 5:00:00 CDT, Blood, Stop date 08/26/19 5:00:00 CDT, Lab Collect, 08/26/19 5:00:00 CDT  ISS  review home meds  follow labs  DVt prophylaxis  protonix IV  IV fluids  pain control  IV antiemetics   Problem List/Past Medical History  Ongoing  Anxiety disorder  Diabetic ketoacidosis  Diabetic peripheral neuropathy  Morbid obesity  Historical  Diabetes mellitus type 1  Gastroparesis due to type 1 diabetes mellitus  Procedure/Surgical History  Insertion of Infusion Device into Superior Vena Cava, Percutaneous Approach (10/13/2017)  Insertion of peripherally inserted central venous catheter (PICC), without subcutaneous port or pump; age 5 years or older (10/13/2017)  Biopsy Gastrointestinal (03/08/2017)  Esophagogastroduodenoscopy (03/08/2017)  Excision of Stomach, Via Natural or Artificial Opening Endoscopic, Diagnostic (03/08/2017)  Introduction of Other Therapeutic  Substance into Upper GI, Via Natural or Artificial Opening Endoscopic (2017)  Sclerotherapy (2017)  J tube placement (2017)  Low cervical  section (11/10/2014)   Section (None) (2014)  DIRECT ADMISSION OF PATIENT FOR HOSPITAL OBSERVATION CARE (10/28/2014)  HOSPITAL OBSERVATION SERVICE, PER HOUR (10/28/2014)  DIRECT ADMISSION OF PATIENT FOR HOSPITAL OBSERVATION CARE (2014)  gallbladder removed  J tube removal  wisdom teeth removed   Medications  Inpatient  acetaminophen, 650 mg= 20.3 mL, Oral, q6hr, PRN  acetaminophen, 1000 mg= 2 tab(s), Oral, q6hr, PRN  albuterol, 2.5 mg= 3 mL, NEB, q4hr, PRN  cloNIDine, 0.2 mg= 1 tab(s), Oral, q8hr, PRN  Creon 36,000-114,000- 180,000 unit capsule,delayed release(D, 1 capsule, Oral, AC  Dextrose 50% and Water (50 mL vial/syringe), 12.5 gm= 25 mL, IV Push, Once, PRN  Dextrose 50% and Water (50 mL vial/syringe), 12.5 gm= 25 mL, IV Push, As Directed, PRN  Dextrose 50% and Water (50 mL vial/syringe), 25 gm= 50 mL, IV Push, As Directed, PRN  Dextrose 50% in Water intravenous solution, 12.5 gm= 25 mL, IV Push, As Directed, PRN  DULoxetine 60 mg oral delayed release capsule, 60 mg= 1 cap(s), Oral, Daily  gabapentin 300 mg oral capsule, 600 mg= 2 cap(s), Oral, TID  glucagon, 1 mg= 1 EA, IM, q10min, PRN  glucagon, 1 mg= 1 EA, IM, q10min, PRN  griseofulvin microcrystalline 125 mg/5 mL oral suspension, 500 mg= 20 mL, Oral, Daily  insulin isophane human recombinant 100 units/mL subcutaneous injection, 10 units= 0.1 mL, Subcutaneous, TID  insulin lispro, 2-14 units, Subcutaneous, As Directed, PRN  LANTUS 100/ML INJ, 55 units, Subcutaneous, At Bedtime  Lovenox, 40 mg= 0.4 mL, Subcutaneous, Daily  Normal Saline (0.9% NS) IV 1,000 mL, 1000 mL, IV  Normal Saline (0.9% NS) IV 1,000 mL, 1000 mL, IV  Phenergan, 12.5 mg= 0.5 mL, IV Push, q4hr, PRN  Protonix IV 40 mg intravenous injection +  100 mL  Reglan, 20 mg= 4 mL, IV Push, Once-NOW  Sodium Chloride  0.9% intravenous solution 1,000 mL, 1000 mL, IV  Toradol  Home  ALPRAZOLAM 0.5MG TABLETS  cefprozil 500 mg oral tablet, 500 mg= 1 tab(s), Oral, BID,? ?Not taking  Creon 36,000-114,000- 180,000 unit capsule,delayed release(DR/EC),? ?Not taking  DULOXETINE DR 60MG CAPSULES, 60 mg= 1 cap(s), Oral, Daily,? ?Not taking  fluconazole 150 mg oral tablet, 150 mg= 1 tab(s), Oral, Once  gabapentin 300 mg oral capsule, 600 mg= 2 cap(s), Oral, TID  griseofulvin microcrystalline 500 mg oral tablet, 500 mg= 1 tab(s), Oral, Daily,? ?Not Taking, Completed Rx  HUMALOG 100 U/ML KWIK PEN INJ 3ML,? ?Not taking  Humalog 100 units/mL subcutaneous injection, 5 units, Subcutaneous, With Meals,? ?Not taking  insulin isophane human recombinant 100 units/mL subcutaneous injection, 10 units, Subcutaneous, TID, 10 refills,? ?Not taking  ketoconazole 2% topical shampoo, 1 tamy, TOP, Once,? ?Not taking  LANTUS 100/ML INJ, 55 units, Subcutaneous, At Bedtime  MIRTAZAPINE 30MG OD TAB, 30 mg= 1 tab(s), Oral, qPM,? ?Not taking  nystatin-triamcinolone 100,000 units/g-0.1% topical cream, 1 tamy, TOP, TID  Phenergan 12.5 mg Tab, 12.5 mg= 1 tab(s), Oral, q6hr, PRN  sulfamethoxazole-trimethoprim 800 mg-160 mg oral tablet, 1 tab(s), Oral, BID,? ?Not taking  Toradol 10 mg oral tablet, 10 mg= 1 tab(s), Oral, TID,? ?Not taking  Victoza 18 mg/3 mL subcutaneous injection, 1.2 mg, Subcutaneous, Daily,? ?Not taking  Allergies  No Known Allergies  Social History  Abuse/Neglect  No, 08/25/2019  Alcohol - Denies Alcohol Use, 08/27/2014  Current, 1-2 times per year, 10/14/2017  Never, 05/11/2016  Home/Environment  Lives with Children. Living situation: Home/Independent., 12/29/2016  Nutrition/Health  Type of diet: GLUCERNA., 04/06/2017  Substance Use - Denies Substance Abuse, 08/27/2014  Never, 05/11/2016  Tobacco - Denies Tobacco Use, 08/27/2014  Never (less than 100 in lifetime), N/A, 08/25/2019  Family History  Diabetes mellitus type 2: Mother.  Hypertension.:  Father.  Immunizations  Vaccine Date Status Comments   influenza virus vaccine, inactivated - Not Given Patient Refuses     tetanus/diphtheria/pertussis, acel(Tdap) 11/10/2014 Given    influenza virus vaccine, inactivated - Not Given Patient Refuses     influenza virus vaccine, inactivated - Not Given Contraindicated - Do not give     Lab Results  Test Name Test Result Date/Time Comments   Sodium Lvl 139 mmol/L 08/25/2019 16:20 CDT    Sodium Lvl 131 mmol/L (Low) 08/25/2019 14:23 CDT    Potassium Lvl 4.1 mmol/L 08/25/2019 16:20 CDT    Potassium Lvl 5.1 mmol/L 08/25/2019 14:23 CDT    Chloride 105 mmol/L 08/25/2019 16:20 CDT    Chloride 96 mmol/L (Low) 08/25/2019 14:23 CDT    CO2 26 mmol/L 08/25/2019 16:20 CDT    CO2 27 mmol/L 08/25/2019 14:23 CDT    Glucose Lvl 363 mg/dL (High) 08/25/2019 16:20 CDT    Glucose Lvl 616 mg/dL (Critical) 08/25/2019 14:23 CDT Critical result called to Supriya CARRANZA in ED by PAZ on 8/25/2019 14:54:50 CDT_ and verified by verbal readback.   BUN 10 mg/dL 08/25/2019 16:20 CDT    BUN 12 mg/dL 08/25/2019 14:23 CDT    Creatinine 0.87 mg/dL 08/25/2019 16:20 CDT    Creatinine 1.12 mg/dL 08/25/2019 14:23 CDT    WBC 6.5 x10(3)/mcL 08/25/2019 14:23 CDT    Hgb 13.9 gm/dL 08/25/2019 14:23 CDT    Hct 44.7 % 08/25/2019 14:23 CDT    Platelet 220 x10(3)/mcL 08/25/2019 14:23 CDT

## 2022-05-05 NOTE — HISTORICAL [facility]
This is a historical note converted from [employer]. Formatting and pictures may have been removed.  Please reference [facility] for original formatting and attached multimedia. Admit and Discharge Dates  Admit Date: 08/25/2019  Discharge Date: 08/28/2019  Physicians  Attending Physician - Cielo PARNELL, Regulo MCCULLOUGH  Admitting Physician - Cielo PARNELL, Regulo MCCULLOUGH  Primary Care Physician - Todd PARNELL, Thea GOYAL  Primary Care Physician - Ld PARNELL, Tremaine  Discharge Diagnosis  1.?Type 1 diabetes mellitus with hyperglycemia?E10.65  2.?Gastroparesis due to DM?E11.43  3.?Dehydration?E86.0  4.?Weakness or fatigue?[id number]  Hyperglycemia?[id number]  Surgical Procedures  No procedures recorded for this visit.  Immunizations  No immunizations recorded for this visit.  Hospital Course  35yo female admitted for uncontrolled DM with hyperglycemia.? She has severe gastroparesis and is also c/o nausea and abdominal pain.??? She was placed on IV fluids and antiemetics.? Placed on ISS as well.? She is now tolerating a diet and is stable for discharge.? She was also placed on Reglan as well.  Time Spent on discharge  D/C=34mins  Objective  Vitals & Measurements  T:?36.7? ?C (Oral)? TMIN:?36.7? ?C (Oral)? TMAX:?36.8? ?C (Oral)? HR:?73(Peripheral)? HR:?84(Monitored)? RR:?18? BP:?102/67? SpO2:?100%?  Physical Exam  General: ?Alert and oriented, No acute distress. ?  ??????? ? Appearance: Well nourished. ?  ??????? ? Behavior: Appropriate. ?  ??????? ? Skin: Normal for ethnicity. ?  ?????Eye: ?Pupils are equal, round and reactive to light, Extraocular movements are intact. ?  ?????HENT: ?Normocephalic, Normal hearing, Oral mucosa is moist, No pharyngeal erythema. ?  ?????Neck: ?Supple, Non-tender, No carotid bruit, No jugular venous distention, No lymphadenopathy, No thyromegaly. ?  ?????Respiratory: ?Lungs are clear to auscultation, Breath sounds are equal, No chest wall tenderness.  ?  ?????Cardiovascular: ?Normal rate, Regular rhythm, No murmur, No gallop, Good pulses equal in all extremities, Normal peripheral perfusion, No edema. ?  ?????Gastrointestinal: ?Soft, MILDLY tender, Non-distended, Normal bowel sounds, No organomegaly. ?  ?????Genitourinary: ?No costovertebral angle tenderness, No urethral discharge. ?  ?????Lymphatics: ?No lymphadenopathy neck, axilla, groin. ?  ?????Musculoskeletal: ?Normal range of motion, Normal strength, No tenderness, No swelling, Normal gait. ?  ?????Integumentary: ?Warm, Dry, Pink, Intact, No rash. ?  ?????Neurologic: ?Alert, Oriented, Normal sensory, Normal motor function, No focal deficits, Cranial Nerves II-XII are grossly intact. ?  ?????Psychiatric: ?Cooperative, Appropriate mood & affect, Normal judgment. ?  Patient Discharge Condition  stable  Discharge Disposition  home   Discharge Medication Reconciliation  Prescribed  DULoxetine (DULoxetine 60 mg oral delayed release capsule)?60 mg, Oral, Daily  Template Non-Formulary Med  gabapentin (gabapentin 300 mg oral capsule)?600 mg, Oral, TID  griseofulvin (griseofulvin microcrystalline 500 mg oral tablet)  metoclopramide (Reglan 10 mg oral tablet)?10 mg, Oral, QIDACHS  Continue  alprazolam (ALPRAZOLAM 0.5MG TABLETS)  insulin glargine (Basaglar KwikPen 100 units/mL subcutaneous solution)?60, Subcutaneous, Once a day (at bedtime)  insulin isophane (NPH) (insulin isophane human recombinant 100 units/mL subcutaneous injection)?10 units, Subcutaneous, TID  insulin lispro (Humalog 100 units/mL subcutaneous injection)?5 units, Subcutaneous, With Meals  nystatin-triamcinolone topical (nystatin-triamcinolone 100,000 units/g-0.1% topical cream)?1 tamy, TOP, TID  promethazine (Phenergan 12.5 mg Tab)?12.5 mg, Oral, q6hr, PRN for nausea  promethazine (Phenergan 12.5 mg Tab)?12.5 mg, Oral, q6hr, PRN for nausea  Discontinue  DULoxetine (DULOXETINE DR 60MG CAPSULES)?60 mg, Oral, Daily  cefprozil (cefprozil 500 mg oral  tablet)?500 mg, Oral, BID  fluconazole (fluconazole 150 mg oral tablet)?150 mg, Oral, Once  gabapentin (gabapentin 300 mg oral capsule)?600 mg, Oral, TID  griseofulvin (griseofulvin microcrystalline 500 mg oral tablet)?500 mg, Oral, Daily  insulin glargine (LANTUS 100/ML INJ)?55 units, Subcutaneous, At Bedtime  insulin lispro (HUMALOG 100 U/ML KWIK PEN INJ 3ML)  ketoconazole topical (ketoconazole 2% topical shampoo)?1 tamy, TOP, Once  ketorolac (Toradol 10 mg oral tablet)?10 mg, Oral, TID  liraglutide (Victoza 18 mg/3 mL subcutaneous injection)?1.2 mg, Subcutaneous, Daily  mirtazapine (MIRTAZAPINE 30MG OD TAB)?30 mg, Oral, qPM  pancrelipase (Creon 36,000-114,000- 180,000 unit capsule,delayed release(DR/EC))  sulfamethoxazole-trimethoprim (sulfamethoxazole-trimethoprim 800 mg-160 mg oral tablet)?1 tab(s), Oral, BID  Education and Orders Provided  Discharge - 08/28/19 10:04:00 CDT, Home?  Discharge Diet - Diabetic?  Discharge Activity - Activity as Tolerated?  Follow up  pcp as scheduled

## 2022-09-14 ENCOUNTER — HOSPITAL ENCOUNTER (EMERGENCY)
Facility: HOSPITAL | Age: 37
Discharge: HOME | End: 2022-09-14
Attending: FAMILY MEDICINE
Payer: MEDICAID

## 2022-09-14 VITALS
SYSTOLIC BLOOD PRESSURE: 115 MMHG | OXYGEN SATURATION: 100 % | TEMPERATURE: 99 F | HEIGHT: 64 IN | RESPIRATION RATE: 12 BRPM | DIASTOLIC BLOOD PRESSURE: 69 MMHG | BODY MASS INDEX: 22.2 KG/M2 | WEIGHT: 130 LBS | HEART RATE: 96 BPM

## 2022-09-14 DIAGNOSIS — E10.65 HYPERGLYCEMIA DUE TO TYPE 1 DIABETES MELLITUS: Primary | ICD-10-CM

## 2022-09-14 LAB
ALBUMIN SERPL-MCNC: 3.6 GM/DL (ref 3.5–5)
ALBUMIN/GLOB SERPL: 1.1 RATIO (ref 1.1–2)
ALLENS TEST: ABNORMAL
ALP SERPL-CCNC: 79 UNIT/L (ref 40–150)
ALT SERPL-CCNC: 17 UNIT/L (ref 0–55)
APPEARANCE UR: ABNORMAL
AST SERPL-CCNC: 15 UNIT/L (ref 5–34)
B-HCG SERPL QL: NEGATIVE
BACTERIA #/AREA URNS AUTO: ABNORMAL /HPF
BASOPHILS # BLD AUTO: 0.03 X10(3)/MCL (ref 0–0.2)
BASOPHILS NFR BLD AUTO: 0.3 %
BILIRUB UR QL STRIP.AUTO: NEGATIVE MG/DL
BILIRUBIN DIRECT+TOT PNL SERPL-MCNC: 0.8 MG/DL
BUN SERPL-MCNC: 15 MG/DL (ref 7–18.7)
CALCIUM SERPL-MCNC: 9 MG/DL (ref 8.4–10.2)
CHLORIDE SERPL-SCNC: 99 MMOL/L (ref 98–107)
CO2 SERPL-SCNC: 21 MMOL/L (ref 22–29)
COLOR UR AUTO: YELLOW
CREAT SERPL-MCNC: 1.02 MG/DL (ref 0.55–1.02)
DELSYS: ABNORMAL
EOSINOPHIL # BLD AUTO: 0.04 X10(3)/MCL (ref 0–0.9)
EOSINOPHIL NFR BLD AUTO: 0.4 %
ERYTHROCYTE [DISTWIDTH] IN BLOOD BY AUTOMATED COUNT: 12.7 % (ref 11.5–17)
GFR SERPLBLD CREATININE-BSD FMLA CKD-EPI: >60 MLS/MIN/1.73/M2
GLOBULIN SER-MCNC: 3.3 GM/DL (ref 2.4–3.5)
GLUCOSE SERPL-MCNC: 364 MG/DL (ref 70–110)
GLUCOSE SERPL-MCNC: 386 MG/DL (ref 70–110)
GLUCOSE SERPL-MCNC: 632 MG/DL (ref 74–100)
GLUCOSE UR QL STRIP.AUTO: >=1000 MG/DL
HCO3 UR-SCNC: 20.4 MMOL/L (ref 24–28)
HCT VFR BLD AUTO: 36.7 % (ref 37–47)
HGB BLD-MCNC: 11.5 GM/DL (ref 12–16)
IMM GRANULOCYTES # BLD AUTO: 0.03 X10(3)/MCL (ref 0–0.04)
IMM GRANULOCYTES NFR BLD AUTO: 0.3 %
KETONES UR QL STRIP.AUTO: >=160 MG/DL
LEUKOCYTE ESTERASE UR QL STRIP.AUTO: NEGATIVE UNIT/L
LIPASE SERPL-CCNC: 20 U/L
LYMPHOCYTES # BLD AUTO: 1.85 X10(3)/MCL (ref 0.6–4.6)
LYMPHOCYTES NFR BLD AUTO: 19.5 %
MAGNESIUM SERPL-MCNC: 1.9 MG/DL (ref 1.6–2.6)
MCH RBC QN AUTO: 28.5 PG (ref 27–31)
MCHC RBC AUTO-ENTMCNC: 31.3 MG/DL (ref 33–36)
MCV RBC AUTO: 90.8 FL (ref 80–94)
MONOCYTES # BLD AUTO: 0.32 X10(3)/MCL (ref 0.1–1.3)
MONOCYTES NFR BLD AUTO: 3.4 %
NEUTROPHILS # BLD AUTO: 7.2 X10(3)/MCL (ref 2.1–9.2)
NEUTROPHILS NFR BLD AUTO: 76.1 %
NITRITE UR QL STRIP.AUTO: NEGATIVE
PCO2 BLDA: 40.5 MMHG (ref 35–45)
PH SMN: 7.31 [PH] (ref 7.35–7.45)
PH UR STRIP.AUTO: 5.5 [PH]
PLATELET # BLD AUTO: 248 X10(3)/MCL (ref 130–400)
PMV BLD AUTO: 10.4 FL (ref 7.4–10.4)
PO2 BLDA: 90 MMHG (ref 80–100)
POC BE: -6 MMOL/L
POC SATURATED O2: 96 % (ref 95–100)
POC TCO2: 22 MMOL/L (ref 23–27)
POTASSIUM SERPL-SCNC: 4.7 MMOL/L (ref 3.5–5.1)
PROT SERPL-MCNC: 6.9 GM/DL (ref 6.4–8.3)
PROT UR QL STRIP.AUTO: NEGATIVE MG/DL
RBC # BLD AUTO: 4.04 X10(6)/MCL (ref 4.2–5.4)
RBC #/AREA URNS AUTO: ABNORMAL /HPF
RBC UR QL AUTO: ABNORMAL UNIT/L
SAMPLE: ABNORMAL
SITE: ABNORMAL
SODIUM SERPL-SCNC: 133 MMOL/L (ref 136–145)
SP GR UR STRIP.AUTO: 1.01
SQUAMOUS #/AREA URNS AUTO: ABNORMAL /HPF
UROBILINOGEN UR STRIP-ACNC: 0.2 MG/DL
WBC # SPEC AUTO: 9.5 X10(3)/MCL (ref 4.5–11.5)
WBC #/AREA URNS AUTO: ABNORMAL /HPF

## 2022-09-14 PROCEDURE — 96374 THER/PROPH/DIAG INJ IV PUSH: CPT

## 2022-09-14 PROCEDURE — 36600 WITHDRAWAL OF ARTERIAL BLOOD: CPT

## 2022-09-14 PROCEDURE — 82803 BLOOD GASES ANY COMBINATION: CPT

## 2022-09-14 PROCEDURE — 99291 CRITICAL CARE FIRST HOUR: CPT | Mod: 25

## 2022-09-14 PROCEDURE — 96375 TX/PRO/DX INJ NEW DRUG ADDON: CPT

## 2022-09-14 PROCEDURE — 25000003 PHARM REV CODE 250: Performed by: FAMILY MEDICINE

## 2022-09-14 PROCEDURE — 85025 COMPLETE CBC W/AUTO DIFF WBC: CPT | Performed by: FAMILY MEDICINE

## 2022-09-14 PROCEDURE — 83735 ASSAY OF MAGNESIUM: CPT | Performed by: FAMILY MEDICINE

## 2022-09-14 PROCEDURE — 99900035 HC TECH TIME PER 15 MIN (STAT)

## 2022-09-14 PROCEDURE — 96361 HYDRATE IV INFUSION ADD-ON: CPT

## 2022-09-14 PROCEDURE — 63600175 PHARM REV CODE 636 W HCPCS: Performed by: FAMILY MEDICINE

## 2022-09-14 PROCEDURE — 81001 URINALYSIS AUTO W/SCOPE: CPT | Performed by: FAMILY MEDICINE

## 2022-09-14 PROCEDURE — 80053 COMPREHEN METABOLIC PANEL: CPT | Performed by: FAMILY MEDICINE

## 2022-09-14 PROCEDURE — 83690 ASSAY OF LIPASE: CPT | Performed by: FAMILY MEDICINE

## 2022-09-14 PROCEDURE — 81025 URINE PREGNANCY TEST: CPT | Performed by: FAMILY MEDICINE

## 2022-09-14 PROCEDURE — 36415 COLL VENOUS BLD VENIPUNCTURE: CPT | Performed by: FAMILY MEDICINE

## 2022-09-14 RX ORDER — DROPERIDOL 2.5 MG/ML
1.25 INJECTION, SOLUTION INTRAMUSCULAR; INTRAVENOUS
Status: COMPLETED | OUTPATIENT
Start: 2022-09-14 | End: 2022-09-14

## 2022-09-14 RX ORDER — DIPHENHYDRAMINE HYDROCHLORIDE 50 MG/ML
25 INJECTION INTRAMUSCULAR; INTRAVENOUS
Status: COMPLETED | OUTPATIENT
Start: 2022-09-14 | End: 2022-09-14

## 2022-09-14 RX ORDER — SODIUM CHLORIDE 9 MG/ML
INJECTION, SOLUTION INTRAVENOUS CONTINUOUS
Status: DISCONTINUED | OUTPATIENT
Start: 2022-09-14 | End: 2022-09-14 | Stop reason: HOSPADM

## 2022-09-14 RX ORDER — MORPHINE SULFATE 4 MG/ML
4 INJECTION, SOLUTION INTRAMUSCULAR; INTRAVENOUS
Status: COMPLETED | OUTPATIENT
Start: 2022-09-14 | End: 2022-09-14

## 2022-09-14 RX ORDER — ONDANSETRON 2 MG/ML
4 INJECTION INTRAMUSCULAR; INTRAVENOUS
Status: COMPLETED | OUTPATIENT
Start: 2022-09-14 | End: 2022-09-14

## 2022-09-14 RX ADMIN — SODIUM CHLORIDE: 9 INJECTION, SOLUTION INTRAVENOUS at 12:09

## 2022-09-14 RX ADMIN — DROPERIDOL 1.25 MG: 2.5 INJECTION, SOLUTION INTRAMUSCULAR; INTRAVENOUS at 01:09

## 2022-09-14 RX ADMIN — ONDANSETRON 4 MG: 2 INJECTION INTRAMUSCULAR; INTRAVENOUS at 12:09

## 2022-09-14 RX ADMIN — MORPHINE SULFATE 4 MG: 4 INJECTION INTRAVENOUS at 01:09

## 2022-09-14 RX ADMIN — DIPHENHYDRAMINE HYDROCHLORIDE 25 MG: 50 INJECTION, SOLUTION INTRAMUSCULAR; INTRAVENOUS at 12:09

## 2022-09-14 RX ADMIN — HUMAN INSULIN 9 UNITS: 100 INJECTION, SOLUTION SUBCUTANEOUS at 01:09

## 2022-09-14 RX ADMIN — SODIUM CHLORIDE: 9 INJECTION, SOLUTION INTRAVENOUS at 01:09

## 2022-09-14 NOTE — ED PROVIDER NOTES
Encounter Date: 9/14/2022       History     Chief Complaint   Patient presents with    Hyperglycemia     Hyperglycemia  CBG >500 in triage   insulin pump stopped working several days ago     37-year-old female with history of type 1 diabetes and gastroparesis presents with hyperglycemia since Sunday.  Patient states her insulin pump stopped working on Sunday.  She called the company who tried to troubleshoot it without resolution.  Patient has been given herself subcu insulin on a sliding scale but cannot get her sugars down.  She is complaining of generalized fatigue, weakness, nausea, and abdominal cramping over the past 2 days.  Patient states the company is getting her a new insulin pump tomorrow.  Denies fever or sick contacts.  Denies chest pain or shortness of breath.  No other complaints.    Review of patient's allergies indicates:  No Known Allergies  No past medical history on file.  No past surgical history on file.  No family history on file.     Review of Systems   Constitutional:  Positive for fatigue.   HENT: Negative.     Eyes: Negative.    Respiratory: Negative.     Cardiovascular: Negative.    Gastrointestinal:  Positive for abdominal pain and nausea.   Endocrine: Negative.    Genitourinary: Negative.    Musculoskeletal: Negative.    Skin: Negative.    Allergic/Immunologic: Negative.    Neurological:  Positive for weakness.   Hematological: Negative.    Psychiatric/Behavioral: Negative.       Physical Exam     Initial Vitals [09/14/22 1217]   BP Pulse Resp Temp SpO2   129/88 101 20 98.6 °F (37 °C) 100 %      MAP       --         Physical Exam    Nursing note and vitals reviewed.  Constitutional: She appears well-developed and well-nourished.   HENT:   Head: Normocephalic and atraumatic.   Eyes: EOM are normal. Pupils are equal, round, and reactive to light.   Neck: Neck supple.   Normal range of motion.  Cardiovascular:  Normal rate and regular rhythm.           Pulmonary/Chest: Breath sounds  normal.   Abdominal: Abdomen is soft. Bowel sounds are normal.   Musculoskeletal:         General: Normal range of motion.      Cervical back: Normal range of motion and neck supple.     Neurological: She is alert and oriented to person, place, and time. She has normal strength. GCS score is 15. GCS eye subscore is 4. GCS verbal subscore is 5. GCS motor subscore is 6.   Skin: Skin is warm. Capillary refill takes less than 2 seconds.   Psychiatric: She has a normal mood and affect.       ED Course   Critical Care    Date/Time: 9/14/2022 2:18 PM  Performed by: John Abdi MD  Authorized by: John Abdi MD   Direct patient critical care time: 40 minutes  Additional history critical care time: 10 minutes  Ordering / reviewing critical care time: 10 minutes  Documentation critical care time: 10 minutes  Total critical care time (exclusive of procedural time) : 70 minutes  Critical care time was exclusive of separately billable procedures and treating other patients.  Critical care was necessary to treat or prevent imminent or life-threatening deterioration of the following conditions: dehydration, metabolic crisis and endocrine crisis.  Critical care was time spent personally by me on the following activities: development of treatment plan with patient or surrogate, interpretation of cardiac output measurements, evaluation of patient's response to treatment, examination of patient, obtaining history from patient or surrogate, ordering and performing treatments and interventions, ordering and review of laboratory studies, re-evaluation of patient's condition, pulse oximetry and review of old charts.  Comments: Patient given IV insulin.      Labs Reviewed   COMPREHENSIVE METABOLIC PANEL - Abnormal; Notable for the following components:       Result Value    Sodium Level 133 (*)     Carbon Dioxide 21 (*)     Glucose Level 632 (*)     All other components within normal limits   URINALYSIS, REFLEX TO URINE CULTURE -  Abnormal; Notable for the following components:    Appearance, UA Slightly Cloudy (*)     Glucose, UA >=1000 (*)     Blood, UA Trace-Intact (*)     All other components within normal limits   CBC WITH DIFFERENTIAL - Abnormal; Notable for the following components:    RBC 4.04 (*)     Hgb 11.5 (*)     Hct 36.7 (*)     MCHC 31.3 (*)     All other components within normal limits   URINALYSIS, MICROSCOPIC - Abnormal; Notable for the following components:    Bacteria, UA Trace (*)     Squamous Epithelial Cells, UA Few (*)     All other components within normal limits   POCT GLUCOSE, HAND-HELD DEVICE - Abnormal; Notable for the following components:    POC Glucose 386 (*)     All other components within normal limits   POCT GLUCOSE, HAND-HELD DEVICE - Abnormal; Notable for the following components:    POC Glucose 364 (*)     All other components within normal limits   ISTAT PROCEDURE - Abnormal; Notable for the following components:    POC PH 7.309 (*)     POC HCO3 20.4 (*)     POC TCO2 22 (*)     All other components within normal limits   LIPASE - Normal   MAGNESIUM - Normal   PREGNANCY TEST, URINE RAPID - Normal   CBC W/ AUTO DIFFERENTIAL    Narrative:     The following orders were created for panel order CBC auto differential.  Procedure                               Abnormality         Status                     ---------                               -----------         ------                     CBC with Differential[[id number]]        Abnormal            Final result                 Please view results for these tests on the individual orders.          Imaging Results    None          Medications   0.9%  NaCl infusion (0 mL/hr Intravenous Stopped 9/14/22 1459)   ondansetron injection 4 mg (4 mg Intravenous Given 9/14/22 1232)   morphine injection 4 mg (4 mg Intravenous Given 9/14/22 1307)   droperidoL injection 1.25 mg (1.25 mg Intravenous Given 9/14/22 1306)   diphenhydrAMINE injection 25 mg (25 mg Intravenous  Given 9/14/22 1245)   insulin regular injection 9 Units 0.09 mL (9 Units Intravenous Given 9/14/22 1332)     Medical Decision Making:   ED Management:  Patient is nontoxic-appearing in no acute distress.  Vital signs stable.  Benign physical exam. Workup shows hyperglycemia without anion gap elevation.  She is not acidotic. Patient was hydrated with IV fluids.  She was given IV insulin.  Patient feels much better.  Tolerating p.o. intake.  Feels safe being discharged home.  She has subcu insulin at home.  Hopefully she will be able to get her insulin pump fixed or replaced tomorrow.  Call follow-up with your PCP and endocrinologist as soon as possible for further evaluation.  Strict return to ER precautions given, patient voiced understanding.                        Clinical Impression:   Final diagnoses:  [E10.65] Hyperglycemia due to type 1 diabetes mellitus (Primary)      ED Disposition Condition    Discharge Stable          ED Prescriptions    None       Follow-up Information       Follow up With Specialties Details Why Contact Info    Thea Brooks MD Family Medicine   [address] [place] [address]  [phone]               John Abdi MD  09/14/22 1431       John Abdi MD  09/14/22 1456       John Abdi MD  09/14/22 6525

## 2022-09-14 NOTE — ED NOTES
Pt arrives to ED ambulatory with reports of high blood sugar. Pt reports that insulin pump stopped working 2-3 days ago.   Pt reports nausea and abdominal pain. NAD is noted at time of assessment. Will continue to monitor.

## 2022-09-15 LAB
POCT GLUCOSE: 364 MG/DL (ref 70–110)
POCT GLUCOSE: 386 MG/DL (ref 70–110)
POCT GLUCOSE: >500 MG/DL (ref 70–110)

## 2022-09-22 ENCOUNTER — HISTORICAL (OUTPATIENT)
Dept: ADMINISTRATIVE | Facility: HOSPITAL | Age: 37
End: 2022-09-22
Payer: MEDICAID

## 2023-01-03 ENCOUNTER — LAB VISIT (OUTPATIENT)
Dept: LAB | Facility: HOSPITAL | Age: 38
End: 2023-01-03
Attending: FAMILY MEDICINE
Payer: MEDICAID

## 2023-01-03 DIAGNOSIS — E78.00 HIGH CHOLESTEROL: Primary | ICD-10-CM

## 2023-01-03 DIAGNOSIS — E87.0 TYPE I DIABETES MELLITUS WITH HYPEROSMOLAR COMA: ICD-10-CM

## 2023-01-03 DIAGNOSIS — E10.65 TYPE I DIABETES MELLITUS WITH HYPEROSMOLAR COMA: ICD-10-CM

## 2023-01-03 DIAGNOSIS — Z11.3 SCREEN FOR STD (SEXUALLY TRANSMITTED DISEASE): ICD-10-CM

## 2023-01-03 DIAGNOSIS — E10.69 TYPE I DIABETES MELLITUS WITH HYPEROSMOLAR COMA: ICD-10-CM

## 2023-01-03 DIAGNOSIS — B37.31 CANDIDIASIS OF VAGINA: ICD-10-CM

## 2023-01-03 DIAGNOSIS — Z01.411 ENCOUNTER FOR WELL WOMAN EXAM WITH ABNORMAL FINDINGS: ICD-10-CM

## 2023-01-03 LAB
ALBUMIN SERPL-MCNC: 3.6 G/DL (ref 3.5–5)
ALBUMIN/GLOB SERPL: 1 RATIO (ref 1.1–2)
ALP SERPL-CCNC: 86 UNIT/L (ref 40–150)
ALT SERPL-CCNC: 17 UNIT/L (ref 0–55)
AST SERPL-CCNC: 18 UNIT/L (ref 5–34)
BASOPHILS # BLD AUTO: 0.02 X10(3)/MCL (ref 0–0.2)
BASOPHILS NFR BLD AUTO: 0.4 %
BILIRUBIN DIRECT+TOT PNL SERPL-MCNC: 0.6 MG/DL
BUN SERPL-MCNC: 15 MG/DL (ref 7–18.7)
CALCIUM SERPL-MCNC: 10.1 MG/DL (ref 8.4–10.2)
CHLORIDE SERPL-SCNC: 103 MMOL/L (ref 98–107)
CHOLEST SERPL-MCNC: 208 MG/DL
CHOLEST/HDLC SERPL: 2 {RATIO} (ref 0–5)
CO2 SERPL-SCNC: 29 MMOL/L (ref 22–29)
CREAT SERPL-MCNC: 0.83 MG/DL (ref 0.55–1.02)
CREAT UR-MCNC: 131.4 MG/DL (ref 47–110)
DEPRECATED CALCIDIOL+CALCIFEROL SERPL-MC: 26.3 NG/ML (ref 30–80)
EOSINOPHIL # BLD AUTO: 0.13 X10(3)/MCL (ref 0–0.9)
EOSINOPHIL NFR BLD AUTO: 2.5 %
ERYTHROCYTE [DISTWIDTH] IN BLOOD BY AUTOMATED COUNT: 13.2 % (ref 11–14.5)
EST. AVERAGE GLUCOSE BLD GHB EST-MCNC: 223.1 MG/DL
GFR SERPLBLD CREATININE-BSD FMLA CKD-EPI: >90 MLS/MIN/1.73/M2
GLOBULIN SER-MCNC: 3.6 GM/DL (ref 2.4–3.5)
GLUCOSE SERPL-MCNC: 145 MG/DL (ref 74–100)
HAV IGM SERPL QL IA: NONREACTIVE
HBA1C MFR BLD: 9.4 %
HBV CORE IGM SERPL QL IA: NONREACTIVE
HBV SURFACE AG SERPL QL IA: NONREACTIVE
HCT VFR BLD AUTO: 40.8 % (ref 37–47)
HCV AB SERPL QL IA: NONREACTIVE
HDLC SERPL-MCNC: 112 MG/DL (ref 35–60)
HGB BLD-MCNC: 12.9 GM/DL (ref 12–16)
HIV 1+2 AB+HIV1 P24 AG SERPL QL IA: NONREACTIVE
IMM GRANULOCYTES # BLD AUTO: 0.01 X10(3)/MCL (ref 0–0.04)
IMM GRANULOCYTES NFR BLD AUTO: 0.2 %
LDLC SERPL CALC-MCNC: 83 MG/DL (ref 50–140)
LYMPHOCYTES # BLD AUTO: 1.99 X10(3)/MCL (ref 0.6–4.6)
LYMPHOCYTES NFR BLD AUTO: 38.7 %
MCH RBC QN AUTO: 28.4 PG
MCHC RBC AUTO-ENTMCNC: 31.6 MG/DL (ref 33–36)
MCV RBC AUTO: 89.7 FL (ref 80–94)
MONOCYTES # BLD AUTO: 0.37 X10(3)/MCL (ref 0.1–1.3)
MONOCYTES NFR BLD AUTO: 7.2 %
NEUTROPHILS # BLD AUTO: 2.62 X10(3)/MCL (ref 2.1–9.2)
NEUTROPHILS NFR BLD AUTO: 51 %
PLATELET # BLD AUTO: 260 X10(3)/MCL (ref 140–371)
PMV BLD AUTO: 10.5 FL (ref 9.4–12.4)
POTASSIUM SERPL-SCNC: 4.1 MMOL/L (ref 3.5–5.1)
PROT SERPL-MCNC: 7.2 GM/DL (ref 6.4–8.3)
PROT UR STRIP-MCNC: 16.5 MG/DL
RBC # BLD AUTO: 4.55 X10(6)/MCL (ref 4.2–5.4)
SODIUM SERPL-SCNC: 139 MMOL/L (ref 136–145)
T PALLIDUM AB SER QL: NONREACTIVE
TRIGL SERPL-MCNC: 67 MG/DL (ref 37–140)
TSH SERPL-ACNC: 1.45 UIU/ML (ref 0.35–4.94)
VLDLC SERPL CALC-MCNC: 13 MG/DL
WBC # SPEC AUTO: 5.1 X10(3)/MCL (ref 4.5–11.5)

## 2023-01-03 PROCEDURE — 82570 ASSAY OF URINE CREATININE: CPT

## 2023-01-03 PROCEDURE — 87389 HIV-1 AG W/HIV-1&-2 AB AG IA: CPT

## 2023-01-03 PROCEDURE — 83036 HEMOGLOBIN GLYCOSYLATED A1C: CPT

## 2023-01-03 PROCEDURE — 80074 ACUTE HEPATITIS PANEL: CPT

## 2023-01-03 PROCEDURE — 85025 COMPLETE CBC W/AUTO DIFF WBC: CPT

## 2023-01-03 PROCEDURE — 84443 ASSAY THYROID STIM HORMONE: CPT

## 2023-01-03 PROCEDURE — 82306 VITAMIN D 25 HYDROXY: CPT

## 2023-01-03 PROCEDURE — 80053 COMPREHEN METABOLIC PANEL: CPT

## 2023-01-03 PROCEDURE — 36415 COLL VENOUS BLD VENIPUNCTURE: CPT

## 2023-01-03 PROCEDURE — 80061 LIPID PANEL: CPT

## 2023-01-03 PROCEDURE — 86780 TREPONEMA PALLIDUM: CPT

## 2023-01-03 PROCEDURE — 84156 ASSAY OF PROTEIN URINE: CPT

## 2023-01-05 LAB — PATH REV: NORMAL

## 2023-01-29 ENCOUNTER — HOSPITAL ENCOUNTER (EMERGENCY)
Facility: HOSPITAL | Age: 38
Discharge: HOME | End: 2023-01-29
Attending: EMERGENCY MEDICINE
Payer: MEDICAID

## 2023-01-29 VITALS
SYSTOLIC BLOOD PRESSURE: 128 MMHG | WEIGHT: 130 LBS | TEMPERATURE: 99 F | BODY MASS INDEX: 22.2 KG/M2 | DIASTOLIC BLOOD PRESSURE: 58 MMHG | RESPIRATION RATE: 20 BRPM | OXYGEN SATURATION: 100 % | HEART RATE: 83 BPM | HEIGHT: 64 IN

## 2023-01-29 DIAGNOSIS — R11.14 BILIOUS VOMITING WITH NAUSEA: Primary | ICD-10-CM

## 2023-01-29 DIAGNOSIS — E11.10 DKA (DIABETIC KETOACIDOSIS): ICD-10-CM

## 2023-01-29 DIAGNOSIS — R73.9 HYPERGLYCEMIA: ICD-10-CM

## 2023-01-29 LAB
ALBUMIN SERPL-MCNC: 3.4 G/DL (ref 3.5–5)
ALBUMIN/GLOB SERPL: 1 RATIO (ref 1.1–2)
ALP SERPL-CCNC: 77 UNIT/L (ref 40–150)
ALT SERPL-CCNC: 15 UNIT/L (ref 0–55)
APPEARANCE UR: CLEAR
AST SERPL-CCNC: 17 UNIT/L (ref 5–34)
BACTERIA #/AREA URNS AUTO: ABNORMAL /HPF
BASOPHILS # BLD AUTO: 0.02 X10(3)/MCL (ref 0–0.2)
BASOPHILS NFR BLD AUTO: 0.3 %
BILIRUB UR QL STRIP.AUTO: NEGATIVE MG/DL
BILIRUBIN DIRECT+TOT PNL SERPL-MCNC: 0.2 MG/DL
BUN SERPL-MCNC: 18.3 MG/DL (ref 7–18.7)
CALCIUM SERPL-MCNC: 8.9 MG/DL (ref 8.4–10.2)
CHLORIDE SERPL-SCNC: 100 MMOL/L (ref 98–107)
CO2 SERPL-SCNC: 25 MMOL/L (ref 22–29)
COLOR UR AUTO: YELLOW
CREAT SERPL-MCNC: 1.23 MG/DL (ref 0.55–1.02)
EOSINOPHIL # BLD AUTO: 0.19 X10(3)/MCL (ref 0–0.9)
EOSINOPHIL NFR BLD AUTO: 3 %
ERYTHROCYTE [DISTWIDTH] IN BLOOD BY AUTOMATED COUNT: 12.5 % (ref 11.5–17)
GFR SERPLBLD CREATININE-BSD FMLA CKD-EPI: 59 MLS/MIN/1.73/M2
GLOBULIN SER-MCNC: 3.3 GM/DL (ref 2.4–3.5)
GLUCOSE SERPL-MCNC: 634 MG/DL (ref 74–100)
GLUCOSE UR QL STRIP.AUTO: ABNORMAL MG/DL
HCT VFR BLD AUTO: 36.6 % (ref 37–47)
HGB BLD-MCNC: 11.8 GM/DL (ref 12–16)
IMM GRANULOCYTES # BLD AUTO: 0.01 X10(3)/MCL (ref 0–0.04)
IMM GRANULOCYTES NFR BLD AUTO: 0.2 %
KETONES UR QL STRIP.AUTO: NEGATIVE MG/DL
LEUKOCYTE ESTERASE UR QL STRIP.AUTO: NEGATIVE UNIT/L
LYMPHOCYTES # BLD AUTO: 2.16 X10(3)/MCL (ref 0.6–4.6)
LYMPHOCYTES NFR BLD AUTO: 33.9 %
MCH RBC QN AUTO: 28.3 PG
MCHC RBC AUTO-ENTMCNC: 32.2 MG/DL (ref 33–36)
MCV RBC AUTO: 87.8 FL (ref 80–94)
MONOCYTES # BLD AUTO: 0.37 X10(3)/MCL (ref 0.1–1.3)
MONOCYTES NFR BLD AUTO: 5.8 %
NEUTROPHILS # BLD AUTO: 3.62 X10(3)/MCL (ref 2.1–9.2)
NEUTROPHILS NFR BLD AUTO: 56.8 %
NITRITE UR QL STRIP.AUTO: NEGATIVE
NRBC BLD AUTO-RTO: 0 %
PH UR STRIP.AUTO: 7.5 [PH]
PLATELET # BLD AUTO: 247 X10(3)/MCL (ref 130–400)
PMV BLD AUTO: 10.6 FL (ref 7.4–10.4)
POCT GLUCOSE: 194 MG/DL (ref 70–110)
POCT GLUCOSE: 229 MG/DL (ref 70–110)
POCT GLUCOSE: >500 MG/DL (ref 70–110)
POCT GLUCOSE: >500 MG/DL (ref 70–110)
POTASSIUM SERPL-SCNC: 4.7 MMOL/L (ref 3.5–5.1)
PROT SERPL-MCNC: 6.7 GM/DL (ref 6.4–8.3)
PROT UR QL STRIP.AUTO: NEGATIVE MG/DL
RBC # BLD AUTO: 4.17 X10(6)/MCL (ref 4.2–5.4)
RBC #/AREA URNS AUTO: 339 /HPF
RBC UR QL AUTO: ABNORMAL UNIT/L
SODIUM SERPL-SCNC: 131 MMOL/L (ref 136–145)
SP GR UR STRIP.AUTO: 1.03 (ref 1–1.03)
SQUAMOUS #/AREA URNS AUTO: <5 /HPF
UROBILINOGEN UR STRIP-ACNC: 0.2 MG/DL
WBC # SPEC AUTO: 6.4 X10(3)/MCL (ref 4.5–11.5)
WBC #/AREA URNS AUTO: <5 /HPF

## 2023-01-29 PROCEDURE — 82962 GLUCOSE BLOOD TEST: CPT

## 2023-01-29 PROCEDURE — 81001 URINALYSIS AUTO W/SCOPE: CPT | Performed by: EMERGENCY MEDICINE

## 2023-01-29 PROCEDURE — 96375 TX/PRO/DX INJ NEW DRUG ADDON: CPT

## 2023-01-29 PROCEDURE — 25000003 PHARM REV CODE 250: Performed by: EMERGENCY MEDICINE

## 2023-01-29 PROCEDURE — 93005 ELECTROCARDIOGRAM TRACING: CPT

## 2023-01-29 PROCEDURE — 93010 ELECTROCARDIOGRAM REPORT: CPT | Mod: ,,, | Performed by: INTERNAL MEDICINE

## 2023-01-29 PROCEDURE — 99284 EMERGENCY DEPT VISIT MOD MDM: CPT | Mod: 25

## 2023-01-29 PROCEDURE — 85025 COMPLETE CBC W/AUTO DIFF WBC: CPT | Performed by: EMERGENCY MEDICINE

## 2023-01-29 PROCEDURE — 96361 HYDRATE IV INFUSION ADD-ON: CPT

## 2023-01-29 PROCEDURE — 80053 COMPREHEN METABOLIC PANEL: CPT | Performed by: EMERGENCY MEDICINE

## 2023-01-29 PROCEDURE — 93010 EKG 12-LEAD: ICD-10-PCS | Mod: ,,, | Performed by: INTERNAL MEDICINE

## 2023-01-29 PROCEDURE — 63600175 PHARM REV CODE 636 W HCPCS: Performed by: EMERGENCY MEDICINE

## 2023-01-29 PROCEDURE — 96374 THER/PROPH/DIAG INJ IV PUSH: CPT

## 2023-01-29 RX ORDER — DROPERIDOL 2.5 MG/ML
1.25 INJECTION, SOLUTION INTRAMUSCULAR; INTRAVENOUS
Status: COMPLETED | OUTPATIENT
Start: 2023-01-29 | End: 2023-01-29

## 2023-01-29 RX ORDER — ONDANSETRON 2 MG/ML
4 INJECTION INTRAMUSCULAR; INTRAVENOUS
Status: COMPLETED | OUTPATIENT
Start: 2023-01-29 | End: 2023-01-29

## 2023-01-29 RX ORDER — MORPHINE SULFATE 4 MG/ML
4 INJECTION, SOLUTION INTRAMUSCULAR; INTRAVENOUS
Status: COMPLETED | OUTPATIENT
Start: 2023-01-29 | End: 2023-01-29

## 2023-01-29 RX ORDER — DROPERIDOL 2.5 MG/ML
1.25 INJECTION, SOLUTION INTRAMUSCULAR; INTRAVENOUS ONCE
Status: DISCONTINUED | OUTPATIENT
Start: 2023-01-29 | End: 2023-01-29

## 2023-01-29 RX ORDER — ONDANSETRON 4 MG/1
4 TABLET, ORALLY DISINTEGRATING ORAL EVERY 6 HOURS PRN
Qty: 12 TABLET | Refills: 0 | Status: SHIPPED | OUTPATIENT
Start: 2023-01-29

## 2023-01-29 RX ADMIN — DROPERIDOL 1.25 MG: 2.5 INJECTION, SOLUTION INTRAMUSCULAR; INTRAVENOUS at 07:01

## 2023-01-29 RX ADMIN — MORPHINE SULFATE 4 MG: 4 INJECTION, SOLUTION INTRAMUSCULAR; INTRAVENOUS at 06:01

## 2023-01-29 RX ADMIN — INSULIN HUMAN 15 UNITS: 100 INJECTION, SOLUTION PARENTERAL at 06:01

## 2023-01-29 RX ADMIN — SODIUM CHLORIDE 1000 ML: 9 INJECTION, SOLUTION INTRAVENOUS at 05:01

## 2023-01-29 RX ADMIN — SODIUM CHLORIDE 1000 ML: 9 INJECTION, SOLUTION INTRAVENOUS at 06:01

## 2023-01-29 RX ADMIN — ONDANSETRON 4 MG: 2 INJECTION INTRAMUSCULAR; INTRAVENOUS at 05:01

## 2023-01-29 NOTE — ED PROVIDER NOTES
Encounter Date: 1/29/2023    SCRIBE #1 NOTE: I, Nehemias Fuller, am scribing for, and in the presence of,  Dr. Valdivia. I have scribed the following portions of the note - the EKG reading. Other sections scribed: HPI, ROS, Physical Exam, MDM, Attending.     History     Chief Complaint   Patient presents with    Vomiting    Nausea    Abdominal Pain    Hyperglycemia     Pt has hx of type 1 DM, started having spikes in cbg x3 days ago, but began w/ abd pain, n/v, cp yesterday morning, pt states she was in DKA <1 year ago, presenting w/ same symptoms, states compliancy w/ insulin, wears an insulin pump. CBG >500 in triage.     36 y/o AAF with history of type 1 DM on insulin pump presents to ED for hyperglycemia onset 3 days ago.  Pt states she began having abdominal pain 2 days ago and then nausea and vomiting yesterday morning.  She also reports polydipsia.  Her symptoms are worsening, and they are similar to her symptoms when she was in DKA about 6 months ago, so she decided to come here.  Pt is currently on menstrual cycle.  She denies fever, cough or dysuria.     Primary care physician is Dr. Benavides    The history is provided by the patient.   Emesis   This is a new problem. The current episode started yesterday. The problem has been unchanged. Associated symptoms include abdominal pain. Pertinent negatives include no arthralgias, no chills, no cough, no diarrhea, no fever, no headaches and no myalgias.   Nausea  This is a new problem. The current episode started yesterday. The problem has not changed since onset.Associated symptoms include abdominal pain. Pertinent negatives include no chest pain, no headaches and no shortness of breath.   Abdominal Pain  The current episode started two days ago. The problem has not changed since onset.The other symptoms of the illness include nausea and vomiting. The other symptoms of the illness do not include fever, fatigue, shortness of breath, diarrhea or dysuria.   The patient  states that she believes she is currently not pregnant. Symptoms associated with the illness do not include chills, diaphoresis, constipation, hematuria or back pain. Significant associated medical issues include diabetes.   Review of patient's allergies indicates:  No Known Allergies  Past Medical History:   Diagnosis Date    Diabetes mellitus      No past surgical history on file.  No family history on file.     Review of Systems   Constitutional:  Negative for activity change, chills, diaphoresis, fatigue and fever.   HENT:  Negative for congestion, ear pain, sinus pain and sore throat.    Eyes:  Negative for visual disturbance.   Respiratory:  Negative for cough, shortness of breath, wheezing and stridor.    Cardiovascular:  Negative for chest pain, palpitations and leg swelling.   Gastrointestinal:  Positive for abdominal pain, nausea and vomiting. Negative for constipation, diarrhea and rectal pain.   Endocrine: Positive for polydipsia.   Genitourinary:  Negative for dysuria and hematuria.   Musculoskeletal:  Negative for arthralgias, back pain and myalgias.   Skin:  Negative for rash.   Neurological:  Negative for dizziness, syncope, weakness, numbness and headaches.   All other systems reviewed and are negative.    Physical Exam     Initial Vitals [01/29/23 0441]   BP Pulse Resp Temp SpO2   (!) 144/87 90 20 98.8 °F (37.1 °C) 99 %      MAP       --         Physical Exam    Nursing note and vitals reviewed.  Constitutional: No distress.   HENT:   Head: Normocephalic and atraumatic.   Dry mucous membranes    Eyes: EOM are normal.   Neck: Trachea normal. Neck supple.   Normal range of motion.  Cardiovascular:  Normal rate and regular rhythm.           No murmur heard.  Pulmonary/Chest: Breath sounds normal. No respiratory distress.   Abdominal: Abdomen is soft. Bowel sounds are normal. She exhibits no distension. There is no abdominal tenderness. There is no rebound and no guarding.   Musculoskeletal:          General: Normal range of motion.      Cervical back: Normal range of motion and neck supple.      Lumbar back: Normal range of motion.     Neurological: She is alert and oriented to person, place, and time. She has normal strength.   Skin: Skin is warm and dry. No rash noted.   Psychiatric: She has a normal mood and affect.       ED Course   Procedures  Labs Reviewed   COMPREHENSIVE METABOLIC PANEL - Abnormal; Notable for the following components:       Result Value    Sodium Level 131 (*)     Glucose Level 634 (*)     Creatinine 1.23 (*)     Albumin Level 3.4 (*)     Albumin/Globulin Ratio 1.0 (*)     All other components within normal limits   CBC WITH DIFFERENTIAL - Abnormal; Notable for the following components:    RBC 4.17 (*)     Hgb 11.8 (*)     Hct 36.6 (*)     MCHC 32.2 (*)     MPV 10.6 (*)     All other components within normal limits   URINALYSIS, REFLEX TO URINE CULTURE - Abnormal; Notable for the following components:    Glucose, UA 3+ (*)     Blood, UA 3+ (*)     All other components within normal limits   URINALYSIS, MICROSCOPIC - Abnormal; Notable for the following components:    RBC,  (*)     All other components within normal limits   POCT GLUCOSE - Abnormal; Notable for the following components:    POCT Glucose >500 (*)     All other components within normal limits   POCT GLUCOSE - Abnormal; Notable for the following components:    POCT Glucose >500 (*)     All other components within normal limits   POCT GLUCOSE - Abnormal; Notable for the following components:    POCT Glucose 194 (*)     All other components within normal limits   POCT GLUCOSE - Abnormal; Notable for the following components:    POCT Glucose 229 (*)     All other components within normal limits   CBC W/ AUTO DIFFERENTIAL    Narrative:     The following orders were created for panel order CBC auto differential.  Procedure                               Abnormality         Status                     ---------                                -----------         ------                     CBC with Differential[[id number]]        Abnormal            Final result                 Please view results for these tests on the individual orders.   POCT GLUCOSE, HAND-HELD DEVICE   POCT GLUCOSE, HAND-HELD DEVICE   POCT GLUCOSE MONITORING CONTINUOUS     EKG Readings: (Independently Interpreted)   Initial Reading: No STEMI. Rhythm: Normal Sinus Rhythm. Heart Rate: 86. Ectopy: No Ectopy. Conduction: Normal. ST Segments: Normal ST Segments. T Waves: Normal. Axis: Normal.     Imaging Results    None          Medications   sodium chloride 0.9% bolus 1,000 mL 1,000 mL (0 mLs Intravenous Stopped 1/29/23 0637)   ondansetron injection 4 mg (4 mg Intravenous Given 1/29/23 0531)   morphine injection 4 mg (4 mg Intravenous Given 1/29/23 0649)   sodium chloride 0.9% bolus 1,000 mL 1,000 mL (0 mLs Intravenous Stopped 1/29/23 0730)   insulin regular injection 15 Units 0.15 mL (15 Units Intravenous Given 1/29/23 0655)   droperidoL injection 1.25 mg (1.25 mg Intravenous Given 1/29/23 0752)     Medical Decision Making:   Clinical Tests:   Lab Tests: Ordered and Reviewed        Scribe Attestation:   Scribe #1: I performed the above scribed service and the documentation accurately describes the services I performed. I attest to the accuracy of the note.    Attending Attestation:           Physician Attestation for Scribe:  Physician Attestation Statement for Scribe #1: I, reviewed documentation, as scribed by Nehemias Fuller in my presence, and it is both accurate and complete.           ED Course as of 01/29/23 0907   Sun Jan 29, 2023   0637  Patient still complaining of some nausea despite Zofran given.  Will order Inapsine.  Patient has not vomited here in the ER. [KG]   0905 No vomiting while in the ER.  She states  states nausea is improved.  At time of discharge, CBG is 220. [KG]      ED Course User Index  [KG] Gilberto Valdivia MD             Medical Decision  Making  Differential diagnoses include, but are not limited to: viral syndrome, gastroenteritis, diabetic ketoacidosis, hyperglycemia, electrolyte abnormality     Amount and/or Complexity of Data Reviewed  Labs: ordered.  Discussion of management or test interpretation with external provider(s):   Treatments given while in the ER 1  L normal saline 2 L normal saline and 15 units of insulin IV push.  Zofran and morphine were also given.  Time of discharge, patient states she is feeling much better.          Clinical Impression:   Final diagnoses:  [E11.10] DKA (diabetic ketoacidosis)  [R11.14] Bilious vomiting with nausea (Primary)  [R73.9] Hyperglycemia        ED Disposition Condition    Discharge Stable          ED Prescriptions       Medication Sig Dispense Start Date End Date Auth. Provider    ondansetron (ZOFRAN-ODT) 4 MG TbDL Take 1 tablet (4 mg total) by mouth every 6 (six) hours as needed (Nausea). 12 tablet 1/29/2023 -- Gilberto Valdivia MD          Follow-up Information       Follow up With Specialties Details Why Contact Info    Thea Brooks MD Family Medicine In 2 days  [address] [place] [address]  [phone]      [facility] - Emergency Dept Emergency Medicine  If symptoms worsen [address] [facility] [address] [phone]             Gilberto Valdivia MD  01/29/23 0982

## 2023-02-06 ENCOUNTER — HOSPITAL ENCOUNTER (OUTPATIENT)
Facility: HOSPITAL | Age: 38
Discharge: HOME | End: 2023-02-08
Attending: EMERGENCY MEDICINE | Admitting: HOSPITALIST
Payer: MEDICAID

## 2023-02-06 DIAGNOSIS — E86.0 ACUTE DEHYDRATION: ICD-10-CM

## 2023-02-06 DIAGNOSIS — R73.9 ACUTE HYPERGLYCEMIA: Primary | ICD-10-CM

## 2023-02-06 LAB
ALBUMIN SERPL-MCNC: 3.9 G/DL (ref 3.5–5)
ALBUMIN/GLOB SERPL: 1.2 RATIO (ref 1.1–2)
ALP SERPL-CCNC: 92 UNIT/L (ref 40–150)
ALT SERPL-CCNC: 24 UNIT/L (ref 0–55)
ANION GAP SERPL CALC-SCNC: 13 MEQ/L
APPEARANCE UR: CLEAR
AST SERPL-CCNC: 20 UNIT/L (ref 5–34)
BACTERIA #/AREA URNS AUTO: ABNORMAL /HPF
BASOPHILS # BLD AUTO: 0.04 X10(3)/MCL (ref 0–0.2)
BASOPHILS NFR BLD AUTO: 0.5 %
BILIRUB UR QL STRIP.AUTO: NEGATIVE MG/DL
BILIRUBIN DIRECT+TOT PNL SERPL-MCNC: 1 MG/DL
BUN SERPL-MCNC: 19.5 MG/DL (ref 7–18.7)
BUN SERPL-MCNC: 21.3 MG/DL (ref 7–18.7)
CALCIUM SERPL-MCNC: 8.7 MG/DL (ref 8.4–10.2)
CALCIUM SERPL-MCNC: 9.5 MG/DL (ref 8.4–10.2)
CHLORIDE SERPL-SCNC: 108 MMOL/L (ref 98–107)
CHLORIDE SERPL-SCNC: 94 MMOL/L (ref 98–107)
CO2 SERPL-SCNC: 20 MMOL/L (ref 22–29)
CO2 SERPL-SCNC: 22 MMOL/L (ref 22–29)
COLOR UR AUTO: YELLOW
CREAT SERPL-MCNC: 0.97 MG/DL (ref 0.55–1.02)
CREAT SERPL-MCNC: 1.35 MG/DL (ref 0.55–1.02)
CREAT/UREA NIT SERPL: 20
EOSINOPHIL # BLD AUTO: 0.1 X10(3)/MCL (ref 0–0.9)
EOSINOPHIL NFR BLD AUTO: 1.2 %
ERYTHROCYTE [DISTWIDTH] IN BLOOD BY AUTOMATED COUNT: 12.4 % (ref 11.5–17)
GFR SERPLBLD CREATININE-BSD FMLA CKD-EPI: 53 MLS/MIN/1.73/M2
GFR SERPLBLD CREATININE-BSD FMLA CKD-EPI: >60 MLS/MIN/1.73/M2
GLOBULIN SER-MCNC: 3.3 GM/DL (ref 2.4–3.5)
GLUCOSE SERPL-MCNC: 244 MG/DL (ref 74–100)
GLUCOSE SERPL-MCNC: 723 MG/DL (ref 74–100)
GLUCOSE UR QL STRIP.AUTO: ABNORMAL MG/DL
HCT VFR BLD AUTO: 39 % (ref 37–47)
HGB BLD-MCNC: 12.5 GM/DL (ref 12–16)
IMM GRANULOCYTES # BLD AUTO: 0.04 X10(3)/MCL (ref 0–0.04)
IMM GRANULOCYTES NFR BLD AUTO: 0.5 %
KETONES UR QL STRIP.AUTO: ABNORMAL MG/DL
LEUKOCYTE ESTERASE UR QL STRIP.AUTO: ABNORMAL UNIT/L
LYMPHOCYTES # BLD AUTO: 1.66 X10(3)/MCL (ref 0.6–4.6)
LYMPHOCYTES NFR BLD AUTO: 19.3 %
MCH RBC QN AUTO: 28.6 PG
MCHC RBC AUTO-ENTMCNC: 32.1 MG/DL (ref 33–36)
MCV RBC AUTO: 89.2 FL (ref 80–94)
MONOCYTES # BLD AUTO: 0.3 X10(3)/MCL (ref 0.1–1.3)
MONOCYTES NFR BLD AUTO: 3.5 %
NEUTROPHILS # BLD AUTO: 6.46 X10(3)/MCL (ref 2.1–9.2)
NEUTROPHILS NFR BLD AUTO: 75 %
NITRITE UR QL STRIP.AUTO: NEGATIVE
NRBC BLD AUTO-RTO: 0 %
PH UR STRIP.AUTO: 6 [PH]
PLATELET # BLD AUTO: 277 X10(3)/MCL (ref 130–400)
PMV BLD AUTO: 10.4 FL (ref 7.4–10.4)
POCT GLUCOSE: 251 MG/DL (ref 70–110)
POCT GLUCOSE: 296 MG/DL (ref 70–110)
POCT GLUCOSE: 330 MG/DL (ref 70–110)
POCT GLUCOSE: 455 MG/DL (ref 70–110)
POCT GLUCOSE: >500 MG/DL (ref 70–110)
POTASSIUM SERPL-SCNC: 3.8 MMOL/L (ref 3.5–5.1)
POTASSIUM SERPL-SCNC: 4.9 MMOL/L (ref 3.5–5.1)
PROT SERPL-MCNC: 7.2 GM/DL (ref 6.4–8.3)
PROT UR QL STRIP.AUTO: NEGATIVE MG/DL
RBC # BLD AUTO: 4.37 X10(6)/MCL (ref 4.2–5.4)
RBC #/AREA URNS AUTO: 5 /HPF
RBC UR QL AUTO: NEGATIVE UNIT/L
SODIUM SERPL-SCNC: 129 MMOL/L (ref 136–145)
SODIUM SERPL-SCNC: 141 MMOL/L (ref 136–145)
SP GR UR STRIP.AUTO: 1.03 (ref 1–1.03)
SQUAMOUS #/AREA URNS AUTO: <5 /HPF
UROBILINOGEN UR STRIP-ACNC: 0.2 MG/DL
WBC # SPEC AUTO: 8.6 X10(3)/MCL (ref 4.5–11.5)
WBC #/AREA URNS AUTO: 14 /HPF

## 2023-02-06 PROCEDURE — 96361 HYDRATE IV INFUSION ADD-ON: CPT

## 2023-02-06 PROCEDURE — G0378 HOSPITAL OBSERVATION PER HR: HCPCS

## 2023-02-06 PROCEDURE — 87088 URINE BACTERIA CULTURE: CPT | Performed by: EMERGENCY MEDICINE

## 2023-02-06 PROCEDURE — 99285 EMERGENCY DEPT VISIT HI MDM: CPT | Mod: 25

## 2023-02-06 PROCEDURE — 85025 COMPLETE CBC W/AUTO DIFF WBC: CPT | Performed by: EMERGENCY MEDICINE

## 2023-02-06 PROCEDURE — 87077 CULTURE AEROBIC IDENTIFY: CPT | Performed by: EMERGENCY MEDICINE

## 2023-02-06 PROCEDURE — 63600175 PHARM REV CODE 636 W HCPCS: Performed by: PHYSICIAN ASSISTANT

## 2023-02-06 PROCEDURE — 25000003 PHARM REV CODE 250: Performed by: INTERNAL MEDICINE

## 2023-02-06 PROCEDURE — 96374 THER/PROPH/DIAG INJ IV PUSH: CPT

## 2023-02-06 PROCEDURE — 80053 COMPREHEN METABOLIC PANEL: CPT | Performed by: EMERGENCY MEDICINE

## 2023-02-06 PROCEDURE — 82962 GLUCOSE BLOOD TEST: CPT

## 2023-02-06 PROCEDURE — 63600175 PHARM REV CODE 636 W HCPCS: Performed by: EMERGENCY MEDICINE

## 2023-02-06 PROCEDURE — 81001 URINALYSIS AUTO W/SCOPE: CPT | Performed by: EMERGENCY MEDICINE

## 2023-02-06 PROCEDURE — 96372 THER/PROPH/DIAG INJ SC/IM: CPT | Mod: 59 | Performed by: EMERGENCY MEDICINE

## 2023-02-06 PROCEDURE — 25000003 PHARM REV CODE 250: Performed by: EMERGENCY MEDICINE

## 2023-02-06 PROCEDURE — 63600175 PHARM REV CODE 636 W HCPCS: Performed by: NURSE PRACTITIONER

## 2023-02-06 PROCEDURE — 96372 THER/PROPH/DIAG INJ SC/IM: CPT | Performed by: PHYSICIAN ASSISTANT

## 2023-02-06 RX ORDER — IBUPROFEN 200 MG
24 TABLET ORAL
Status: DISCONTINUED | OUTPATIENT
Start: 2023-02-06 | End: 2023-02-08 | Stop reason: HOSPADM

## 2023-02-06 RX ORDER — ACETAMINOPHEN 325 MG/1
650 TABLET ORAL EVERY 8 HOURS PRN
Status: DISCONTINUED | OUTPATIENT
Start: 2023-02-06 | End: 2023-02-08 | Stop reason: HOSPADM

## 2023-02-06 RX ORDER — PROMETHAZINE HYDROCHLORIDE 25 MG/ML
25 INJECTION, SOLUTION INTRAMUSCULAR; INTRAVENOUS
Status: COMPLETED | OUTPATIENT
Start: 2023-02-06 | End: 2023-02-06

## 2023-02-06 RX ORDER — ACETAMINOPHEN 325 MG/1
650 TABLET ORAL EVERY 4 HOURS PRN
Status: DISCONTINUED | OUTPATIENT
Start: 2023-02-06 | End: 2023-02-08 | Stop reason: HOSPADM

## 2023-02-06 RX ORDER — INSULIN ASPART 100 [IU]/ML
1-10 INJECTION, SOLUTION INTRAVENOUS; SUBCUTANEOUS
Status: DISCONTINUED | OUTPATIENT
Start: 2023-02-06 | End: 2023-02-07

## 2023-02-06 RX ORDER — IBUPROFEN 200 MG
16 TABLET ORAL
Status: DISCONTINUED | OUTPATIENT
Start: 2023-02-06 | End: 2023-02-08 | Stop reason: HOSPADM

## 2023-02-06 RX ORDER — HYDROCODONE BITARTRATE AND ACETAMINOPHEN 10; 325 MG/1; MG/1
1 TABLET ORAL
Status: COMPLETED | OUTPATIENT
Start: 2023-02-06 | End: 2023-02-06

## 2023-02-06 RX ORDER — SODIUM CHLORIDE 9 MG/ML
100 INJECTION, SOLUTION INTRAVENOUS CONTINUOUS
Status: DISCONTINUED | OUTPATIENT
Start: 2023-02-06 | End: 2023-02-08 | Stop reason: HOSPADM

## 2023-02-06 RX ORDER — GLUCAGON 1 MG
1 KIT INJECTION
Status: DISCONTINUED | OUTPATIENT
Start: 2023-02-06 | End: 2023-02-08 | Stop reason: HOSPADM

## 2023-02-06 RX ORDER — ONDANSETRON 2 MG/ML
4 INJECTION INTRAMUSCULAR; INTRAVENOUS EVERY 8 HOURS PRN
Status: DISCONTINUED | OUTPATIENT
Start: 2023-02-06 | End: 2023-02-08 | Stop reason: HOSPADM

## 2023-02-06 RX ADMIN — SODIUM CHLORIDE 100 ML/HR: 9 INJECTION, SOLUTION INTRAVENOUS at 02:02

## 2023-02-06 RX ADMIN — INSULIN ASPART 8 UNITS: 100 INJECTION, SOLUTION INTRAVENOUS; SUBCUTANEOUS at 06:02

## 2023-02-06 RX ADMIN — SODIUM CHLORIDE, POTASSIUM CHLORIDE, SODIUM LACTATE AND CALCIUM CHLORIDE 1000 ML: 600; 310; 30; 20 INJECTION, SOLUTION INTRAVENOUS at 11:02

## 2023-02-06 RX ADMIN — INSULIN HUMAN 14 UNITS: 100 INJECTION, SOLUTION PARENTERAL at 01:02

## 2023-02-06 RX ADMIN — DICYCLOMINE HYDROCHLORIDE 5 MG: 10 SOLUTION ORAL at 10:02

## 2023-02-06 RX ADMIN — PROMETHAZINE HYDROCHLORIDE 25 MG: 25 INJECTION INTRAMUSCULAR; INTRAVENOUS at 12:02

## 2023-02-06 RX ADMIN — HYDROCODONE BITARTRATE AND ACETAMINOPHEN 1 TABLET: 10; 325 TABLET ORAL at 12:02

## 2023-02-06 RX ADMIN — SODIUM CHLORIDE 1000 ML: 9 INJECTION, SOLUTION INTRAVENOUS at 11:02

## 2023-02-06 NOTE — ED PROVIDER NOTES
Encounter Date: 2/6/2023    SCRIBE #1 NOTE: I, Laly Saldana, am scribing for, and in the presence of,  Gilberto Valdivia MD. I have scribed the following portions of the note - Other sections scribed: HPI, ROS and physical.     History     Chief Complaint   Patient presents with    Hyperglycemia     Pt reports pov c/o hyperglycemia, headache, and lower abd pain since last night. States treated herself with insulin last night, but still unable to get blood sugar down. Hx DM. Has omnipod and dexcom. CBG >500 in triage.      36 y/o female with a medical hx of Type I DM presents to the ED for hyperglycemia onset last night.The pt reports that she has an insulin pump and is also on a sliding scale. She states that she has been giving herself insulin around the clock and yesterday around 1500 her CBG was above 500. The pt reports that she was in the ED a week ago when her CBG was around 600. Reports headache, nausea, and abdominal pain.     The pt's PCP is Thea Brooks MD.     The history is provided by the patient. No  was used.   Diabetes  This is a recurrent problem. She has type 1 diabetes mellitus. Her disease course has been worsening. Pertinent negatives for diabetes include no chest pain, no fatigue, no polydipsia, no polyphagia, no polyuria and no weakness. Symptoms are worsening. Hypoglycemia symptoms include headaches. Pertinent negatives for hypoglycemia include no dizziness, nervousness/anxiousness or seizures. Hypoglycemia complications include hospitalization. Risk factors for coronary artery disease include diabetes mellitus. Current diabetic treatment includes insulin pump, intensive insulin program and insulin injections. Her home blood glucose trend is increasing rapidly. Her highest blood glucose is >200 mg/dl. Her overall blood glucose range is >200 mg/dl. Her weight is stable.   Review of patient's allergies indicates:  No Known Allergies  Past Medical History:   Diagnosis  Date    Diabetes mellitus      No past surgical history on file.  No family history on file.     Review of Systems   Constitutional:  Negative for chills, fatigue and fever.   HENT:  Negative for congestion, ear discharge, ear pain, postnasal drip, rhinorrhea, sinus pressure, sneezing, sore throat and voice change.    Eyes:  Negative for discharge and itching.   Respiratory:  Negative for cough, shortness of breath and wheezing.    Cardiovascular:  Negative for chest pain, palpitations and leg swelling.   Gastrointestinal:  Positive for abdominal pain and nausea. Negative for constipation, diarrhea and vomiting.   Endocrine: Negative for polydipsia, polyphagia and polyuria.   Genitourinary:  Negative for dysuria, frequency, hematuria, urgency, vaginal bleeding, vaginal discharge and vaginal pain.   Musculoskeletal:  Negative for arthralgias and myalgias.   Skin:  Negative for rash and wound.   Neurological:  Positive for headaches. Negative for dizziness, seizures, syncope, weakness and numbness.   Hematological:  Negative for adenopathy. Does not bruise/bleed easily.   Psychiatric/Behavioral:  Negative for self-injury and suicidal ideas. The patient is not nervous/anxious.      Physical Exam     Initial Vitals [02/06/23 0854]   BP Pulse Resp Temp SpO2   129/83 108 17 97.5 °F (36.4 °C) 100 %      MAP       --         Physical Exam    Nursing note and vitals reviewed.  Constitutional: She appears well-developed and well-nourished.   HENT:   Head: Normocephalic and atraumatic.   Right Ear: External ear normal.   Left Ear: External ear normal.   Nose: Nose normal.   Eyes: Conjunctivae and EOM are normal. Pupils are equal, round, and reactive to light. Right eye exhibits no discharge. Left eye exhibits no discharge.   Neck:   Normal range of motion.  Cardiovascular:  Normal rate.           Pulmonary/Chest: Breath sounds normal.   Abdominal: Abdomen is soft. Bowel sounds are normal. She exhibits no distension.    Musculoskeletal:         General: Normal range of motion.      Cervical back: Normal range of motion.     Neurological: She is alert and oriented to person, place, and time. She has normal strength and normal reflexes.   Skin: Skin is dry. Capillary refill takes less than 2 seconds.       ED Course   Critical Care    Date/Time: 2/6/2023 2:02 PM  Performed by: Gilberto Valdivia MD  Authorized by: Gilberto Valdivia MD   Direct patient critical care time: 15 minutes  Additional history critical care time: 5 minutes  Ordering / reviewing critical care time: 10 minutes  Documentation critical care time: 15 minutes  Consulting other physicians critical care time: 5 minutes  Total critical care time (exclusive of procedural time) : 50 minutes  Critical care time was exclusive of separately billable procedures and treating other patients and teaching time.  Critical care was necessary to treat or prevent imminent or life-threatening deterioration of the following conditions: dehydration, metabolic crisis and endocrine crisis.  Critical care was time spent personally by me on the following activities: development of treatment plan with patient or surrogate, discussions with primary provider, interpretation of cardiac output measurements, evaluation of patient's response to treatment, examination of patient, obtaining history from patient or surrogate, ordering and performing treatments and interventions, ordering and review of laboratory studies, re-evaluation of patient's condition and review of old charts.      Labs Reviewed   COMPREHENSIVE METABOLIC PANEL - Abnormal; Notable for the following components:       Result Value    Sodium Level 129 (*)     Chloride 94 (*)     Glucose Level 723 (*)     Blood Urea Nitrogen 21.3 (*)     Creatinine 1.35 (*)     All other components within normal limits   URINALYSIS, REFLEX TO URINE CULTURE - Abnormal; Notable for the following components:    Glucose, UA 3+ (*)     Ketones, UA  2+ (*)     Leukocyte Esterase, UA Trace (*)     All other components within normal limits   CBC WITH DIFFERENTIAL - Abnormal; Notable for the following components:    MCHC 32.1 (*)     All other components within normal limits   URINALYSIS, MICROSCOPIC - Abnormal; Notable for the following components:    WBC, UA 14 (*)     All other components within normal limits   POCT GLUCOSE - Abnormal; Notable for the following components:    POCT Glucose >500 (*)     All other components within normal limits   POCT GLUCOSE - Abnormal; Notable for the following components:    POCT Glucose 455 (*)     All other components within normal limits   CULTURE, URINE   CBC W/ AUTO DIFFERENTIAL    Narrative:     The following orders were created for panel order CBC auto differential.  Procedure                               Abnormality         Status                     ---------                               -----------         ------                     CBC with Differential[[id number]]        Abnormal            Final result                 Please view results for these tests on the individual orders.   BASIC METABOLIC PANEL   POCT GLUCOSE, HAND-HELD DEVICE   POCT GLUCOSE, HAND-HELD DEVICE          Imaging Results              X-Ray Chest PA And Lateral (Final result)  Result time 02/06/23 09:22:07      Final result by Antonio Hidalgo MD (02/06/23 09:22:07)                   Impression:      NEGATIVE CHEST RADIOGRAPHS.      Electronically signed by: Antonio Hidalgo  Date:    02/06/2023  Time:    09:22               Narrative:    EXAMINATION:  XR CHEST PA AND LATERAL    CLINICAL HISTORY:  Hyperglycemia;    TECHNIQUE:  Two view radiography.    COMPARISON:  November 29, 2021    FINDINGS:  Heart and mediastinum are unremarkable. Well expanded lungs are without focal consolidation, congestion, pleural effusions or pneumothorax.                                       Medications   0.9%  NaCl infusion (100 mL/hr Intravenous New Bag 2/6/23 1400)    lactated ringers bolus 1,000 mL (0 mLs Intravenous Stopped 2/6/23 1215)   sodium chloride 0.9% bolus 1,000 mL 1,000 mL (0 mLs Intravenous Stopped 2/6/23 1249)   promethazine injection 25 mg (25 mg Intramuscular Given 2/6/23 1201)   HYDROcodone-acetaminophen  mg per tablet 1 tablet (1 tablet Oral Given 2/6/23 1200)   insulin regular injection 14 Units 0.14 mL (14 Units Intravenous Given 2/6/23 1323)     Medical Decision Making:   Initial Assessment:   As per HPI  Differential Diagnosis:   Differential Diagnosis includes, but is not limited to:   DKA. Hyperglycemia. Dehydration. Electrolyte abnormality.   Clinical Tests:   Lab Tests: Ordered and Reviewed  Radiological Study: Ordered and Reviewed  ED Management:  Patient was given 2 liters of normal saline during ER stay and Phenergan for nausea and Norco for abdominal pain.  Patient was also given 14 units of insulin.  After 2 liters saline bolus, patient's blood sugar went from over 700 to 420.  Regular insulin 14 units was then given IV push.  This is a 2nd visit for blood sugar over 600 in the last 2 weeks.  Will call to admit patient for observation.        Scribe Attestation:   Scribe #1: I performed the above scribed service and the documentation accurately describes the services I performed. I attest to the accuracy of the note.    Attending Attestation:           Physician Attestation for Scribe:  Physician Attestation Statement for Scribe #1: I, Gilberto Valdivia MD, reviewed documentation, as scribed by Laly Saldana in my presence, and it is both accurate and complete.           ED Course as of 02/06/23 1448   Mon Feb 06, 2023   1132 After 2 liter of normal saline, patient's blood sugar is just over 400.  Insulin was ordered [KG]   1352 Patient states she is starting to feel better, decreased nausea and decreased abdominal pain. [KG]   1352 Hospitalist was paged [KG]   1430 After insulin 14 units IV push, patient's blood sugar is 251 [KG]   1442  Juan NP, OK to admit [KG]      ED Course User Index  [KG] Gilberto Valdivia MD                 Clinical Impression:   Final diagnoses:  [R73.9] Acute hyperglycemia (Primary)  [E86.0] Acute dehydration        ED Disposition Condition    Observation Stable                Gilberto Valdivia MD  02/06/23 0407

## 2023-02-07 LAB
ALBUMIN SERPL-MCNC: 2.9 G/DL (ref 3.5–5)
ALBUMIN/GLOB SERPL: 1 RATIO (ref 1.1–2)
ALP SERPL-CCNC: 68 UNIT/L (ref 40–150)
ALT SERPL-CCNC: 16 UNIT/L (ref 0–55)
AST SERPL-CCNC: 12 UNIT/L (ref 5–34)
BASOPHILS # BLD AUTO: 0.03 X10(3)/MCL (ref 0–0.2)
BASOPHILS NFR BLD AUTO: 0.5 %
BILIRUBIN DIRECT+TOT PNL SERPL-MCNC: 0.4 MG/DL
BUN SERPL-MCNC: 13.1 MG/DL (ref 7–18.7)
CALCIUM SERPL-MCNC: 8.1 MG/DL (ref 8.4–10.2)
CHLORIDE SERPL-SCNC: 106 MMOL/L (ref 98–107)
CO2 SERPL-SCNC: 22 MMOL/L (ref 22–29)
CREAT SERPL-MCNC: 0.95 MG/DL (ref 0.55–1.02)
EOSINOPHIL # BLD AUTO: 0.19 X10(3)/MCL (ref 0–0.9)
EOSINOPHIL NFR BLD AUTO: 2.9 %
ERYTHROCYTE [DISTWIDTH] IN BLOOD BY AUTOMATED COUNT: 12.7 % (ref 11.5–17)
GFR SERPLBLD CREATININE-BSD FMLA CKD-EPI: >60 MLS/MIN/1.73/M2
GLOBULIN SER-MCNC: 2.8 GM/DL (ref 2.4–3.5)
GLUCOSE SERPL-MCNC: 498 MG/DL (ref 74–100)
HCT VFR BLD AUTO: 32.9 % (ref 37–47)
HGB BLD-MCNC: 10.4 GM/DL (ref 12–16)
IMM GRANULOCYTES # BLD AUTO: 0.02 X10(3)/MCL (ref 0–0.04)
IMM GRANULOCYTES NFR BLD AUTO: 0.3 %
LYMPHOCYTES # BLD AUTO: 2.72 X10(3)/MCL (ref 0.6–4.6)
LYMPHOCYTES NFR BLD AUTO: 41.2 %
MCH RBC QN AUTO: 28.3 PG
MCHC RBC AUTO-ENTMCNC: 31.6 MG/DL (ref 33–36)
MCV RBC AUTO: 89.4 FL (ref 80–94)
MONOCYTES # BLD AUTO: 0.41 X10(3)/MCL (ref 0.1–1.3)
MONOCYTES NFR BLD AUTO: 6.2 %
NEUTROPHILS # BLD AUTO: 3.23 X10(3)/MCL (ref 2.1–9.2)
NEUTROPHILS NFR BLD AUTO: 48.9 %
NRBC BLD AUTO-RTO: 0 %
PLATELET # BLD AUTO: 250 X10(3)/MCL (ref 130–400)
PMV BLD AUTO: 10.5 FL (ref 7.4–10.4)
POCT GLUCOSE: 301 MG/DL (ref 70–110)
POCT GLUCOSE: 339 MG/DL (ref 70–110)
POCT GLUCOSE: 469 MG/DL (ref 70–110)
POTASSIUM SERPL-SCNC: 4.5 MMOL/L (ref 3.5–5.1)
PROT SERPL-MCNC: 5.7 GM/DL (ref 6.4–8.3)
RBC # BLD AUTO: 3.68 X10(6)/MCL (ref 4.2–5.4)
SODIUM SERPL-SCNC: 136 MMOL/L (ref 136–145)
WBC # SPEC AUTO: 6.6 X10(3)/MCL (ref 4.5–11.5)

## 2023-02-07 PROCEDURE — 96372 THER/PROPH/DIAG INJ SC/IM: CPT | Performed by: PHYSICIAN ASSISTANT

## 2023-02-07 PROCEDURE — 85025 COMPLETE CBC W/AUTO DIFF WBC: CPT | Performed by: PHYSICIAN ASSISTANT

## 2023-02-07 PROCEDURE — G0378 HOSPITAL OBSERVATION PER HR: HCPCS

## 2023-02-07 PROCEDURE — 63600175 PHARM REV CODE 636 W HCPCS: Performed by: PHYSICIAN ASSISTANT

## 2023-02-07 PROCEDURE — 63600175 PHARM REV CODE 636 W HCPCS: Performed by: INTERNAL MEDICINE

## 2023-02-07 PROCEDURE — 96372 THER/PROPH/DIAG INJ SC/IM: CPT | Performed by: INTERNAL MEDICINE

## 2023-02-07 PROCEDURE — 25000003 PHARM REV CODE 250: Performed by: STUDENT IN AN ORGANIZED HEALTH CARE EDUCATION/TRAINING PROGRAM

## 2023-02-07 PROCEDURE — 80053 COMPREHEN METABOLIC PANEL: CPT | Performed by: PHYSICIAN ASSISTANT

## 2023-02-07 RX ORDER — INSULIN LISPRO 100 [IU]/ML
INJECTION, SOLUTION INTRAVENOUS; SUBCUTANEOUS
COMMUNITY
Start: 2023-01-21

## 2023-02-07 RX ORDER — INSULIN ASPART 100 [IU]/ML
0-15 INJECTION, SOLUTION INTRAVENOUS; SUBCUTANEOUS
Status: DISCONTINUED | OUTPATIENT
Start: 2023-02-07 | End: 2023-02-08 | Stop reason: HOSPADM

## 2023-02-07 RX ADMIN — INSULIN ASPART 6 UNITS: 100 INJECTION, SOLUTION INTRAVENOUS; SUBCUTANEOUS at 09:02

## 2023-02-07 RX ADMIN — DOCUSATE SODIUM 50 MG: 50 CAPSULE, LIQUID FILLED ORAL at 04:02

## 2023-02-07 RX ADMIN — INSULIN ASPART 8 UNITS: 100 INJECTION, SOLUTION INTRAVENOUS; SUBCUTANEOUS at 05:02

## 2023-02-07 RX ADMIN — INSULIN ASPART 8 UNITS: 100 INJECTION, SOLUTION INTRAVENOUS; SUBCUTANEOUS at 11:02

## 2023-02-07 RX ADMIN — INSULIN ASPART 10 UNITS: 100 INJECTION, SOLUTION INTRAVENOUS; SUBCUTANEOUS at 04:02

## 2023-02-07 NOTE — PLAN OF CARE
Problem: Adult Inpatient Plan of Care  Goal: Plan of Care Review  Outcome: Ongoing, Progressing  Goal: Patient-Specific Goal (Individualized)  Outcome: Ongoing, Progressing  Goal: Absence of Hospital-Acquired Illness or Injury  Outcome: Ongoing, Progressing  Goal: Optimal Comfort and Wellbeing  Outcome: Ongoing, Progressing  Goal: Readiness for Transition of Care  Outcome: Ongoing, Progressing     Problem: Hyperglycemia  Goal: Blood Glucose Level Within Targeted Range  Outcome: Ongoing, Progressing

## 2023-02-07 NOTE — NURSING
Nurses Note -- 4 Eyes      2/7/2023   4:21 AM      Skin assessed during: Admit      [x] No Pressure Injuries Present    []Prevention Measures Documented      [] Yes- Altered Skin Integrity Present or Discovered   [] LDA Added if Not in Epic (Describe Wound)   [] New Altered Skin Integrity was Present on Admit and Documented in LDA   [] Wound Image Taken    Wound Care Consulted? No    Attending Nurse:  Kareen Zuniga RN     Second RN/Staff Member:  Alex Villareal RN

## 2023-02-07 NOTE — PROGRESS NOTES
"[facility]  Hospital Medicine Progress Note      Chief Complaint: high blood glucose, nausea, vomiting, abdominal pain   HPI:   37 y.o. female with a past medical history of diabetes mellitus type 1 and neuropathy presented to [facility] on 2/6/2023 for hyperglycemia.  Patient reports intermittent hyperglycemia for the past week.  Patient reports she presented to ED 1 week ago for similar symptoms and was discharged.  Patient states her glucose yesterday was above 500.  Patient reports she is on a sliding scale and insulin pump home.  Patient's endocrinologist is Dr. Willis. Patient also complains of nausea, urinary frequency, polydipsia, and intermittent left lower quadrant abdominal pain.  Patient denies fever, chills, dysuria, hematuria, vomiting, diarrhea, chest pain, and shortness of breath.  Initial vital signs in ED were /83, pulse 108, respirations 17, temperature 36.4° C, and SpO2 100% on room air.  Labs revealed WBC 8.6, sodium 129, chloride 94, CO2 22, BUN 21.3, creatinine 1.35, and glucose 723.  UA revealed 3+ glucose, 2+ proteins, trace leukocytes, and 14 wbc's.  Urine culture was obtained.  Chest x-ray was negative.  Patient was given 2 L of normal saline in ED with repeat CBG of 455.  CBG decreased to 251 after 14 units of insulin. Patient was also given IM promethazine 25 mg and Norco  mg in ED. Patient was admitted to hospital medicine service for observation and further medical management.     Interval Hx:   Patient tells me that she has a new pump at home but has not been instructed on how to use it; she currently has her "old" pump attached but it is not on  She has been on clear liquid diet but agreeable to advancement; abdominal pain is present and likely related to constipation; nausea/vomiting is improved.  She has been an diabetic for over 10years and is typically controlled but as of recently she has required recurrent hospitalizations thus the reason for " the new pump by her Endocrinologist, whom she is very pleased with.     Objective/physical exam:  General: Appears comfortable, no acute distress.  Integumentary: Warm, dry, intact.  Neuro: awake, alert, oriented, comprehension intact  Musculoskeletal: Purposeful movement noted.   Respiratory: No accessory muscle use. Breath sounds are equal.  Cardiovascular: Regular rate. No peripheral edema.    VITAL SIGNS: 24 HRS MIN & MAX LAST   Temp  Min: 97.8 °F (36.6 °C)  Max: 98.7 °F (37.1 °C) 97.8 °F (36.6 °C)   BP  Min: 109/64  Max: 145/80 117/74   Pulse  Min: 74  Max: 101  74   Resp  Min: 18  Max: 18 18   SpO2  Min: 96 %  Max: 100 % 99 %     X-Ray Chest PA And Lateral  Narrative: EXAMINATION:  XR CHEST PA AND LATERAL    CLINICAL HISTORY:  Hyperglycemia;    TECHNIQUE:  Two view radiography.    COMPARISON:  November 29, 2021    FINDINGS:  Heart and mediastinum are unremarkable. Well expanded lungs are without focal consolidation, congestion, pleural effusions or pneumothorax.  Impression: NEGATIVE CHEST RADIOGRAPHS.    Electronically signed by: Antonio Hidalgo  Date:    02/06/2023  Time:    09:22    Recent Labs   Lab 02/06/23  0924 02/07/23  0357   WBC 8.6 6.6   RBC 4.37 3.68*   HGB 12.5 10.4*   HCT 39.0 32.9*   MCV 89.2 89.4   MCH 28.6 28.3   MCHC 32.1* 31.6*   RDW 12.4 12.7    250   MPV 10.4 10.5*       Recent Labs   Lab 02/06/23  0924 02/06/23  1454 02/07/23  0357   * 141 136   K 4.9 3.8 4.5   CO2 22 20* 22   BUN 21.3* 19.5* 13.1   CREATININE 1.35* 0.97 0.95   CALCIUM 9.5 8.7 8.1*   ALBUMIN 3.9  --  2.9*   ALKPHOS 92  --  68   ALT 24  --  16   AST 20  --  12   BILITOT 1.0  --  0.4          Microbiology Results (last 7 days)       Procedure Component Value Units Date/Time    Urine culture [[id number]]  (Abnormal) Collected: 02/06/23 1130    Order Status: Completed Specimen: Urine Updated: 02/07/23 0732     Urine Culture 50,000-75,000 colonies/ml Yeast species             See below for Radiology    Scheduled  Med:       Continuous Infusions:   sodium chloride 0.9% 100 mL/hr (02/06/23 1400)        PRN Meds:  acetaminophen, acetaminophen, dextrose 10%, dextrose 10%, dicyclomine, glucagon (human recombinant), glucose, glucose, insulin aspart U-100, ondansetron     Nutrition Status:  Clear liquid diet, advance as tolerated    Assessment/Plan:  Uncontrolled diabetes mellitus type 1 with hyperglycemia  Acute abdominal pain   Nausea      Patient presents with abdominal pain nausea vomiting to be hyperglycemic.  Labs reviewed, low suspicion infectious etiology.  Consider CT of the abdomen if with no improvement.    Continue p.r.n. Zofran and Bentyl.    Type 1 diabetes on insulin pump, continue to hold pump for now.  Will increase sliding scale/bolus insulin dose and hold off on pump in the setting of decreased oral intake, advanced patient's diet as tolerated.    She is currently on clear liquid diet.      Anemic but hemodynamically stable  Get KUB to evaluate possible constipation; add stool softener    Anticipated discharge and Disposition:  Anticipate discharge in 24-48hrs      All diagnosis and differential diagnosis have been reviewed,  interpreted and communicated appropriately to care team. assessment and plan has been documented; I have personally reviewed the labs and test results that are presently available and pertinent to this hospital course; I have reviewed medical records based upon their availability.    All of the patient's questions have been  addressed and answered. Patient's is agreeable to the above stated plan.   I will continue to monitor closely and make adjustments to medical management as needed.      Anastasia Glez,    02/07/2023        This note was created with the assistance of Dragon voice recognition software. There may be transcription errors as a result of using this technology however minimal. Effort has been made to assure accuracy of transcription but any obvious errors or omissions  should be clarified with the author of the document.

## 2023-02-08 VITALS
SYSTOLIC BLOOD PRESSURE: 148 MMHG | HEART RATE: 72 BPM | HEIGHT: 64 IN | OXYGEN SATURATION: 98 % | DIASTOLIC BLOOD PRESSURE: 90 MMHG | RESPIRATION RATE: 18 BRPM | BODY MASS INDEX: 22.2 KG/M2 | WEIGHT: 130 LBS | TEMPERATURE: 99 F

## 2023-02-08 PROBLEM — R73.9 HYPERGLYCEMIA: Status: ACTIVE | Noted: 2023-02-08

## 2023-02-08 LAB
ANION GAP SERPL CALC-SCNC: 5 MEQ/L
BUN SERPL-MCNC: 11 MG/DL (ref 7–18.7)
CALCIUM SERPL-MCNC: 9.2 MG/DL (ref 8.4–10.2)
CHLORIDE SERPL-SCNC: 105 MMOL/L (ref 98–107)
CO2 SERPL-SCNC: 30 MMOL/L (ref 22–29)
CREAT SERPL-MCNC: 0.82 MG/DL (ref 0.55–1.02)
CREAT/UREA NIT SERPL: 13
GFR SERPLBLD CREATININE-BSD FMLA CKD-EPI: >60 MLS/MIN/1.73/M2
GLUCOSE SERPL-MCNC: 139 MG/DL (ref 74–100)
GLUCOSE SERPL-MCNC: 264 MG/DL (ref 70–110)
MAGNESIUM SERPL-MCNC: 1.8 MG/DL (ref 1.6–2.6)
PHOSPHATE SERPL-MCNC: 2.8 MG/DL (ref 2.3–4.7)
POCT GLUCOSE: 415 MG/DL (ref 70–110)
POTASSIUM SERPL-SCNC: 4.1 MMOL/L (ref 3.5–5.1)
SODIUM SERPL-SCNC: 140 MMOL/L (ref 136–145)

## 2023-02-08 PROCEDURE — 80048 BASIC METABOLIC PNL TOTAL CA: CPT | Performed by: STUDENT IN AN ORGANIZED HEALTH CARE EDUCATION/TRAINING PROGRAM

## 2023-02-08 PROCEDURE — 84100 ASSAY OF PHOSPHORUS: CPT | Performed by: STUDENT IN AN ORGANIZED HEALTH CARE EDUCATION/TRAINING PROGRAM

## 2023-02-08 PROCEDURE — 63600175 PHARM REV CODE 636 W HCPCS: Performed by: STUDENT IN AN ORGANIZED HEALTH CARE EDUCATION/TRAINING PROGRAM

## 2023-02-08 PROCEDURE — 96372 THER/PROPH/DIAG INJ SC/IM: CPT | Performed by: INTERNAL MEDICINE

## 2023-02-08 PROCEDURE — 96376 TX/PRO/DX INJ SAME DRUG ADON: CPT

## 2023-02-08 PROCEDURE — 83735 ASSAY OF MAGNESIUM: CPT | Performed by: STUDENT IN AN ORGANIZED HEALTH CARE EDUCATION/TRAINING PROGRAM

## 2023-02-08 PROCEDURE — 25000003 PHARM REV CODE 250: Performed by: EMERGENCY MEDICINE

## 2023-02-08 PROCEDURE — 25000003 PHARM REV CODE 250: Performed by: STUDENT IN AN ORGANIZED HEALTH CARE EDUCATION/TRAINING PROGRAM

## 2023-02-08 PROCEDURE — 63600175 PHARM REV CODE 636 W HCPCS: Performed by: INTERNAL MEDICINE

## 2023-02-08 PROCEDURE — G0378 HOSPITAL OBSERVATION PER HR: HCPCS

## 2023-02-08 RX ADMIN — SODIUM CHLORIDE 100 ML/HR: 9 INJECTION, SOLUTION INTRAVENOUS at 10:02

## 2023-02-08 RX ADMIN — INSULIN ASPART 9 UNITS: 100 INJECTION, SOLUTION INTRAVENOUS; SUBCUTANEOUS at 04:02

## 2023-02-08 RX ADMIN — INSULIN ASPART 12 UNITS: 100 INJECTION, SOLUTION INTRAVENOUS; SUBCUTANEOUS at 05:02

## 2023-02-08 RX ADMIN — INSULIN HUMAN 6 UNITS: 100 INJECTION, SOLUTION PARENTERAL at 11:02

## 2023-02-08 RX ADMIN — INSULIN ASPART 9 UNITS: 100 INJECTION, SOLUTION INTRAVENOUS; SUBCUTANEOUS at 11:02

## 2023-02-08 RX ADMIN — DOCUSATE SODIUM 50 MG: 50 CAPSULE, LIQUID FILLED ORAL at 11:02

## 2023-02-08 RX ADMIN — INSULIN ASPART 10 UNITS: 100 INJECTION, SOLUTION INTRAVENOUS; SUBCUTANEOUS at 12:02

## 2023-02-08 NOTE — DISCHARGE SUMMARY
"[facility]  Hospital Medicine Discharge Summary    Admit Date: 2/6/2023  Discharge Date and Time: 2/8/20232:26 PM  Admitting Physician: KERVIN Team  Discharging Physician: Anastasia Glez DO.  Primary Care Physician: Thea Brooks MD    Discharge Diagnoses:  Uncontrolled diabetes mellitus type 1 with hyperglycemia    Hospital Course:   37 y.o. female with a past medical history of diabetes mellitus type 1 and neuropathy presented to [facility] on 2/6/2023 for hyperglycemia.  Patient reports intermittent hyperglycemia for the past week.  Patient reports she presented to ED 1 week ago for similar symptoms and was discharged.  Patient states her glucose yesterday was above 500.  Patient reports she is on a sliding scale and insulin pump home.  Patient's endocrinologist is Dr. Willis. Patient also complains of nausea, urinary frequency, polydipsia, and intermittent left lower quadrant abdominal pain.  Patient denies fever, chills, dysuria, hematuria, vomiting, diarrhea, chest pain, and shortness of breath.  Initial vital signs in ED were /83, pulse 108, respirations 17, temperature 36.4° C, and SpO2 100% on room air.  Labs revealed WBC 8.6, sodium 129, chloride 94, CO2 22, BUN 21.3, creatinine 1.35, and glucose 723.  UA revealed 3+ glucose, 2+ proteins, trace leukocytes, and 14 wbc's.  Urine culture was obtained.  Chest x-ray was negative.  Patient was given 2 L of normal saline in ED with repeat CBG of 455.  CBG decreased to 251 after 14 units of insulin. Patient was also given IM promethazine 25 mg and Norco  mg in ED. Patient was admitted to hospital medicine service for observation and further medical management.   Patient tells me that she has a new pump at home but has not been instructed on how to use it; she currently has her "old" pump attached but it is not on  She has been on clear liquid diet but agreeable to advancement; abdominal pain is present and likely related to " constipation; nausea/vomiting is improved.  She has been an diabetic for over 10years and is typically controlled but as of recently she has required recurrent hospitalizations thus the reason for the new pump by her Endocrinologist, whom she is very pleased with.   Patient has insulin at home and will restart insulin pump at discharge, she has an appointment with her endocrinologist on tomorrow.  Her blood glucose is currently 221 patient's remains asymptomatic.  She is tolerating normal food without nausea vomiting or reported abdominal pain.    She did complain of constipation during hospital course however KUB showed no ileus or obstruction but was consistent with, constipation.  Patient has been given stool softener during hospital course and is encouraged to take over-the-counter medications as needed for bowel movement.    Patient will discharge home today with follow up with her endocrinologist tomorrow.    Hospital course and discharge care plan has been discussed with patient, patient voices understanding and agreement with plan. All questions have been answered to the best of my ability. Patient is advised to return to ED or call 911 in case of emergency and or if symptoms worsen.        Vitals:  VITAL SIGNS: 24 HRS MIN & MAX LAST   Temp  Min: 97.6 °F (36.4 °C)  Max: 98.8 °F (37.1 °C) 98.3 °F (36.8 °C)   BP  Min: 120/74  Max: 150/92 134/84   Pulse  Min: 74  Max: 86  84   Resp  Min: 17  Max: 20 18   SpO2  Min: 99 %  Max: 100 % 100 %       Physical Exam:    General: Appears comfortable, no acute distress.  Integumentary: Warm, dry, intact.  Neuro:  Alert awake oriented.  Musculoskeletal: Purposeful movement noted.   Respiratory: No accessory muscle use. Breath sounds are equal.  Cardiovascular: Regular rate. No peripheral edema.      Procedures Performed: No admission procedures for hospital encounter.     Significant Diagnostic Studies: See Full reports for all details    Recent Labs   Lab 02/06/23  2695  02/07/23  0357   WBC 8.6 6.6   RBC 4.37 3.68*   HGB 12.5 10.4*   HCT 39.0 32.9*   MCV 89.2 89.4   MCH 28.6 28.3   MCHC 32.1* 31.6*   RDW 12.4 12.7    250   MPV 10.4 10.5*       Recent Labs   Lab 02/06/23  0924 02/06/23  1454 02/07/23  0357 02/08/23  0853   * 141 136 140   K 4.9 3.8 4.5 4.1   CO2 22 20* 22 30*   BUN 21.3* 19.5* 13.1 11.0   CREATININE 1.35* 0.97 0.95 0.82   CALCIUM 9.5 8.7 8.1* 9.2   MG  --   --   --  1.80   ALBUMIN 3.9  --  2.9*  --    ALKPHOS 92  --  68  --    ALT 24  --  16  --    AST 20  --  12  --    BILITOT 1.0  --  0.4  --         Microbiology Results (last 7 days)       Procedure Component Value Units Date/Time    Urine culture [[id number]]  (Abnormal) Collected: 02/06/23 1130    Order Status: Completed Specimen: Urine Updated: 02/08/23 1408     Urine Culture 50,000-75,000 colonies/ml Yeast species             X-Ray Abdomen AP 1 View  Narrative: EXAMINATION:  XR ABDOMEN AP 1 VIEW    CLINICAL HISTORY:  constipation;    TECHNIQUE:  AP View(s) of the abdomen was performed.    COMPARISON:  08/30/2021    FINDINGS:  The There are findings consistent with constipation.  No free air is seen.  No free fluid is seen.  No organomegaly is seen.  No abnormal calcifications are seen. Bones and joints show no acute abnormality  Impression: Findings consistent with constipation    Electronically signed by: Ke Mcdonald  Date:    02/07/2023  Time:    17:06         Medication List        CONTINUE taking these medications      insulin lispro 100 unit/mL injection     ondansetron 4 MG Tbdl  Commonly known as: ZOFRAN-ODT  Take 1 tablet (4 mg total) by mouth every 6 (six) hours as needed (Nausea).               Explained in detail to the patient about the discharge plan, medications, and follow-up visits. Pt understands and agrees with the treatment plan  Discharge Disposition: Home or Self Care   Discharged Condition: stable  Diet-   Dietary Orders (From admission, onward)       Start     Ordered     02/07/23 1130  Diet diabetic  Diet effective now         02/07/23 1130                   Medications Per DC med rec  Activities as tolerated   Follow-up Information       followup with endocrinologist tomorrow (appointment already scheduled) Follow up.                           For further questions contact hospitalist office    Discharge time 33 minutes    For worsening symptoms, chest pain, shortness of breath, increased abdominal pain, high grade fever, stroke or stroke like symptoms, immediately go to the nearest Emergency Room or call 911 as soon as possible.      Anastasia Bryant M.D, on 2/8/2023. at 2:26 PM.

## 2023-02-09 LAB — BACTERIA UR CULT: ABNORMAL

## 2023-02-13 ENCOUNTER — HOSPITAL ENCOUNTER (EMERGENCY)
Facility: HOSPITAL | Age: 38
Discharge: HOME | End: 2023-02-13
Attending: INTERNAL MEDICINE
Payer: MEDICAID

## 2023-02-13 VITALS
HEIGHT: 65 IN | DIASTOLIC BLOOD PRESSURE: 80 MMHG | BODY MASS INDEX: 21.66 KG/M2 | SYSTOLIC BLOOD PRESSURE: 138 MMHG | WEIGHT: 130 LBS | HEART RATE: 90 BPM | RESPIRATION RATE: 18 BRPM | TEMPERATURE: 98 F | OXYGEN SATURATION: 100 %
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
## 2023-02-13 DIAGNOSIS — M25.552 LEFT HIP PAIN: ICD-10-CM

## 2023-02-13 DIAGNOSIS — M79.632 LEFT FOREARM PAIN: ICD-10-CM

## 2023-02-13 DIAGNOSIS — M25.512 ACUTE PAIN OF LEFT SHOULDER: ICD-10-CM

## 2023-02-13 LAB — B-HCG SERPL QL: NEGATIVE

## 2023-02-13 PROCEDURE — 99284 EMERGENCY DEPT VISIT MOD MDM: CPT

## 2023-02-13 PROCEDURE — 25000003 PHARM REV CODE 250: Performed by: PHYSICIAN ASSISTANT

## 2023-02-13 PROCEDURE — 81025 URINE PREGNANCY TEST: CPT | Performed by: PHYSICIAN ASSISTANT

## 2023-02-13 RX ORDER — IBUPROFEN 400 MG/1
800 TABLET ORAL
Status: COMPLETED | OUTPATIENT
Start: 2023-02-13 | End: 2023-02-13

## 2023-02-13 RX ORDER — HYDROCODONE BITARTRATE AND ACETAMINOPHEN 5; 325 MG/1; MG/1
1 TABLET ORAL
Status: DISCONTINUED | OUTPATIENT
Start: 2023-02-13 | End: 2023-02-13

## 2023-02-13 RX ORDER — DICLOFENAC SODIUM 50 MG/1
50 TABLET, DELAYED RELEASE ORAL 3 TIMES DAILY
Qty: 21 TABLET | Refills: 0 | Status: SHIPPED | OUTPATIENT
Start: 2023-02-13 | End: 2023-02-20

## 2023-02-13 RX ORDER — TIZANIDINE 4 MG/1
4 TABLET ORAL EVERY 8 HOURS
Qty: 21 TABLET | Refills: 0 | Status: SHIPPED | OUTPATIENT
Start: 2023-02-13 | End: 2023-02-20

## 2023-02-13 RX ADMIN — IBUPROFEN 800 MG: 400 TABLET ORAL at 04:02
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
## 2023-02-13 NOTE — DISCHARGE INSTRUCTIONS
You have been prescribed Diclofenac for pain. This is an Non-Steroidal Anti-Inflammatory (NSAID) Medication. Please do not take any additional NSAIDs while you are taking this medication including (Advil, Aleve, Motrin, Ibuprofen, Mobic\meloxicam, Naprosyn, Toradol, ketoralac, etc.). Please stop taking this medication if you experience: weakness, itching, yellow skin or eyes, joint pains, vomiting blood, blood or black stools, unusual weight gain, or swelling in your arms, legs, hands, or feet.     You have been prescribed Tizanidine for muscle spasms/pain. Please do not take this medication while working, drinking alcohol, swimming, or while driving/operating heavy machinery. This medication may cause drowsiness, dizziness, impair judgment, and reduce physical capabilities.You should not drive, operate heavy machinery, or make life changing decisions while taking this medication.      While in the Emergency Department you received medication that may cause drowsiness, dizziness, impaired judgment, and reduced physical capabilities. You should not drive, operate heavy machinery, swim, or make life  changing decisions within 24 hours of receiving this medication.

## 2023-02-17 ENCOUNTER — DOCUMENTATION ONLY (OUTPATIENT)
Dept: ADMINISTRATIVE | Facility: HOSPITAL | Age: 38
End: 2023-02-17
Payer: MEDICAID

## 2023-03-20 ENCOUNTER — HOSPITAL ENCOUNTER (OUTPATIENT)
Dept: RADIOLOGY | Facility: HOSPITAL | Age: 38
Discharge: HOME | End: 2023-03-20
Attending: FAMILY MEDICINE
Payer: MEDICAID

## 2023-03-20 DIAGNOSIS — M25.512 LEFT SHOULDER PAIN: ICD-10-CM

## 2023-03-20 PROCEDURE — 73030 X-RAY EXAM OF SHOULDER: CPT | Mod: TC,LT

## 2023-04-10 ENCOUNTER — LAB VISIT (OUTPATIENT)
Dept: LAB | Facility: HOSPITAL | Age: 38
End: 2023-04-10
Attending: PEDIATRICS
Payer: MEDICAID

## 2023-04-10 DIAGNOSIS — Z23 VARICELLA VACCINATION: Primary | ICD-10-CM

## 2023-04-10 PROCEDURE — 86787 VARICELLA-ZOSTER ANTIBODY: CPT | Mod: 90

## 2023-04-10 PROCEDURE — 36415 COLL VENOUS BLD VENIPUNCTURE: CPT

## 2023-04-12 LAB
VZV IGG SER IA-ACNC: 3.8
VZV IGG SER QL IA: POSITIVE

## 2023-04-24 ENCOUNTER — TELEPHONE (OUTPATIENT)
Dept: OBSTETRICS AND GYNECOLOGY | Facility: CLINIC | Age: 38
End: 2023-04-24
Payer: MEDICAID

## 2023-04-24 NOTE — TELEPHONE ENCOUNTER
----- Message from Barbara Cornejo sent at 4/24/2023 10:14 AM CDT -----  Regarding: Results  Pt asking for results on her pap done 12/21/22. Informed pt to download the tamy that way the nurse can send her the results through the patient portal. If portal is unavailable please call pt    Call wj: [phone]

## 2025-06-17 ENCOUNTER — LAB VISIT (OUTPATIENT)
Dept: LAB | Facility: HOSPITAL | Age: 40
End: 2025-06-17
Attending: INTERNAL MEDICINE
Payer: MEDICAID

## 2025-06-17 DIAGNOSIS — E78.00 PURE HYPERCHOLESTEROLEMIA: ICD-10-CM

## 2025-06-17 DIAGNOSIS — E10.69 TYPE I DIABETES MELLITUS WITH HYPEROSMOLAR COMA: Primary | ICD-10-CM

## 2025-06-17 DIAGNOSIS — E87.0 TYPE I DIABETES MELLITUS WITH HYPEROSMOLAR COMA: Primary | ICD-10-CM

## 2025-06-17 DIAGNOSIS — E10.65 TYPE I DIABETES MELLITUS WITH HYPEROSMOLAR COMA: Primary | ICD-10-CM

## 2025-06-17 LAB
ALBUMIN SERPL-MCNC: 3.4 G/DL (ref 3.5–5)
ALBUMIN/GLOB SERPL: 0.9 RATIO (ref 1.1–2)
ALP SERPL-CCNC: 81 UNIT/L (ref 40–150)
ALT SERPL-CCNC: 15 UNIT/L (ref 0–55)
ANION GAP SERPL CALC-SCNC: 2 MEQ/L
AST SERPL-CCNC: 19 UNIT/L (ref 11–45)
BILIRUB SERPL-MCNC: 0.2 MG/DL
BUN SERPL-MCNC: 12 MG/DL (ref 7–18.7)
CALCIUM SERPL-MCNC: 8.8 MG/DL (ref 8.4–10.2)
CHLORIDE SERPL-SCNC: 108 MMOL/L (ref 98–107)
CHOLEST SERPL-MCNC: 184 MG/DL
CHOLEST/HDLC SERPL: 2 {RATIO} (ref 0–5)
CO2 SERPL-SCNC: 29 MMOL/L (ref 22–29)
CREAT SERPL-MCNC: 0.97 MG/DL (ref 0.55–1.02)
CREAT UR-MCNC: 116.8 MG/DL (ref 45–106)
CREAT/UREA NIT SERPL: 12
EST. AVERAGE GLUCOSE BLD GHB EST-MCNC: 180 MG/DL
GFR SERPLBLD CREATININE-BSD FMLA CKD-EPI: >60 ML/MIN/1.73/M2
GLOBULIN SER-MCNC: 3.9 GM/DL (ref 2.4–3.5)
GLUCOSE SERPL-MCNC: 233 MG/DL (ref 74–100)
HBA1C MFR BLD: 7.9 %
HDLC SERPL-MCNC: 90 MG/DL (ref 35–60)
LDLC SERPL CALC-MCNC: 79 MG/DL (ref 50–140)
POTASSIUM SERPL-SCNC: 4.5 MMOL/L (ref 3.5–5.1)
PROT SERPL-MCNC: 7.3 GM/DL (ref 6.4–8.3)
PROT UR STRIP-MCNC: 13.7 MG/DL
SODIUM SERPL-SCNC: 139 MMOL/L (ref 136–145)
TRIGL SERPL-MCNC: 76 MG/DL (ref 37–140)
TSH SERPL-ACNC: 1.55 UIU/ML (ref 0.35–4.94)
VLDLC SERPL CALC-MCNC: 15 MG/DL

## 2025-06-17 PROCEDURE — 80053 COMPREHEN METABOLIC PANEL: CPT

## 2025-06-17 PROCEDURE — 84156 ASSAY OF PROTEIN URINE: CPT

## 2025-06-17 PROCEDURE — 84443 ASSAY THYROID STIM HORMONE: CPT

## 2025-06-17 PROCEDURE — 82570 ASSAY OF URINE CREATININE: CPT

## 2025-06-17 PROCEDURE — 80061 LIPID PANEL: CPT

## 2025-06-17 PROCEDURE — 83036 HEMOGLOBIN GLYCOSYLATED A1C: CPT

## 2025-06-17 PROCEDURE — 36415 COLL VENOUS BLD VENIPUNCTURE: CPT
